# Patient Record
Sex: FEMALE | Race: WHITE | NOT HISPANIC OR LATINO
[De-identification: names, ages, dates, MRNs, and addresses within clinical notes are randomized per-mention and may not be internally consistent; named-entity substitution may affect disease eponyms.]

---

## 2019-12-05 ENCOUNTER — APPOINTMENT (OUTPATIENT)
Dept: OBGYN | Facility: CLINIC | Age: 39
End: 2019-12-05

## 2019-12-11 ENCOUNTER — APPOINTMENT (OUTPATIENT)
Dept: OBGYN | Facility: CLINIC | Age: 39
End: 2019-12-11
Payer: COMMERCIAL

## 2019-12-11 VITALS
DIASTOLIC BLOOD PRESSURE: 62 MMHG | OXYGEN SATURATION: 98 % | HEART RATE: 83 BPM | BODY MASS INDEX: 26.37 KG/M2 | WEIGHT: 168 LBS | HEIGHT: 67 IN | SYSTOLIC BLOOD PRESSURE: 102 MMHG

## 2019-12-11 DIAGNOSIS — Z11.3 ENCOUNTER FOR SCREENING FOR INFECTIONS WITH A PREDOMINANTLY SEXUAL MODE OF TRANSMISSION: ICD-10-CM

## 2019-12-11 DIAGNOSIS — Z78.9 OTHER SPECIFIED HEALTH STATUS: ICD-10-CM

## 2019-12-11 DIAGNOSIS — F17.200 NICOTINE DEPENDENCE, UNSPECIFIED, UNCOMPLICATED: ICD-10-CM

## 2019-12-11 PROCEDURE — 99203 OFFICE O/P NEW LOW 30 MIN: CPT

## 2019-12-11 NOTE — REVIEW OF SYSTEMS
[Abdominal Pain] : abdominal pain [Pelvic Pain] : pelvic pain [Abn Vag Bleeding] : abnormal vaginal bleeding [Headache] : headache [Depression] : depression [Anxiety] : anxiety [Feeling Weak] : feeling week [Chills] : no chills [Fever] : no fever [Feeling Tired] : not feeling tired [Redness] : no redness [Recent Wt Gain ___ Lbs] : no recent weight gain [Pain] : no pain [Sight Problems] : no sight problems [Dec Hearing] : no hearing loss [Discharge] : no discharge [Sore Throat] : no sore throat [Nasal Discharge] : no nasal discharge [Nosebleeds] : no nosebleeds [Heart Rate Is Fast] : the heart rate was not fast [Palpitations] : no palpitations [Chest Pain] : no chest pain [Lower Ext Edema] : no lower extremity edema [Wheezing] : no wheezing [Dyspnea] : no shortness of breath [Vomiting] : no vomiting [Cough] : no cough [SOB on Exertion] : no shortness of breath during exertion [Constipation] : no constipation [Heartburn] : no heartburn [Diarrhea] : no diarrhea [Melena] : no melena [Dysuria] : no dysuria [Incontinence] : no incontinence [Urethral Discharge] : no abnormal urethral discharge [Frequency] : no frequency [Urgency] : no urgency [Joint Pain] : no joint pain [Arthralgias] : no arthralgias [Skin Lesions] : no skin lesions [Joint Stiffness] : no joint stiffness [Joint Swelling] : no joint swelling [Change In A Mole] : no change in a mole [Breast Pain] : no breast pain [Breast Lump] : no breast lump [Dizziness] : no dizziness [Fainting] : no fainting [Convulsions] : no convulsions [Hot Flashes] : no hot flashes [Sleep Disturbances] : no sleep disturbances [Muscle Weakness] : no muscle weakness [Easy Bleeding] : does not bleed easily [Deepening Voice] : no deepening of the voice [Swollen Glands In The Neck] : no swollen glands in the neck [Easy Bruising] : does not bruise easily [Swollen Glands] : no swollen glands

## 2019-12-11 NOTE — OB HISTORY
[Failure to Progress in Labor] : failure to progress in labor [Repeat ] : repeat  [Pregnancy History] : girl [___] : no pregnancy complications reported

## 2019-12-11 NOTE — CHIEF COMPLAINT
[Initial Visit] : initial GYN visit [FreeTextEntry1] : Breakthru bleeding ( spotting ) past 5 - 6 months Some blood in urine  Denies dysuriia Denies postcoital bleeding  Normally seen at [facility] + yellow discharge with odor

## 2019-12-11 NOTE — PHYSICAL EXAM
[Awake] : awake [Acute Distress] : no acute distress [LAD] : no lymphadenopathy [Alert] : alert [Goiter] : no goiter [Thyroid Nodule] : no thyroid nodule [Mass] : no breast mass [Axillary LAD] : no axillary lymphadenopathy [Nipple Discharge] : no nipple discharge [Tender] : non tender [Distended] : not distended [H/Smegaly] : no hepatosplenomegaly [Oriented x3] : oriented to person, place, and time [Flat Affect] : affect not flat [Vulvar Atrophy] : no vulvar atrophy [Depressed Mood] : not depressed [Labia Minora Erythema] : no erythema of the labia minora [Labia Majora Erythema] : no erythema of the labia majora [Vulvitis] : no vulvitis [Labia Majora] : labia major [Labia Minora] : labia minora [Normal] : clitoris [Atrophy] : no atrophy [Erythema] : no erythema [Cystocele] : no cystocele [Rectocele] : no rectocele [Dry Mucosa] : moist mucosa [No Bleeding] : there was no active vaginal bleeding [Discharge] : had no discharge [Uterine Prolapse] : no uterine prolapse [Erosion] : had no erosions [IUD String] : did not have an IUD string protruding out [Motion Tenderness] : there was no cervical motion tenderness [Soft] :  the cervix was soft [Pap Obtained] : a Pap smear was performed [Normal Position] : in a normal position [Tenderness] : nontender [Mass ___ cm] : no uterine mass was palpated [Uterine Adnexae] : were not tender and not enlarged [Enlarged ___ wks] : not enlarged [Adnexa Tenderness] : were not tender [Ovarian Mass (___ Cm)] : there were no adnexal masses

## 2019-12-11 NOTE — HISTORY OF PRESENT ILLNESS
[Definite:  ___ (Date)] : the last menstrual period was [unfilled] [Normal Amount/Duration] : was of a normal amount and duration [Regular Cycle Intervals] : periods have been regular [Menarche Age: ____] : age at menarche was [unfilled] [Menstrual Cramps] : menstrual cramps [Monogamous] : is monogamous [Sexually Active] : is sexually active [Contraception] : uses contraception [IUD] : has an intrauterine device [Male ___] : [unfilled] male [de-identified] : Paragard X 5 years

## 2019-12-14 LAB
C TRACH RRNA SPEC QL NAA+PROBE: NOT DETECTED
CANDIDA VAG CYTO: NOT DETECTED
G VAGINALIS+PREV SP MTYP VAG QL MICRO: DETECTED
HPV HIGH+LOW RISK DNA PNL CVX: NOT DETECTED
N GONORRHOEA RRNA SPEC QL NAA+PROBE: NOT DETECTED
SOURCE AMPLIFICATION: NORMAL
T VAGINALIS VAG QL WET PREP: NOT DETECTED

## 2019-12-18 LAB — CYTOLOGY CVX/VAG DOC THIN PREP: ABNORMAL

## 2020-01-02 ENCOUNTER — FORM ENCOUNTER (OUTPATIENT)
Age: 40
End: 2020-01-02

## 2020-01-03 ENCOUNTER — APPOINTMENT (OUTPATIENT)
Dept: MAMMOGRAPHY | Facility: HOSPITAL | Age: 40
End: 2020-01-03
Payer: COMMERCIAL

## 2020-01-03 ENCOUNTER — APPOINTMENT (OUTPATIENT)
Dept: ULTRASOUND IMAGING | Facility: HOSPITAL | Age: 40
End: 2020-01-03
Payer: COMMERCIAL

## 2020-01-03 ENCOUNTER — OUTPATIENT (OUTPATIENT)
Dept: OUTPATIENT SERVICES | Facility: HOSPITAL | Age: 40
LOS: 1 days | End: 2020-01-03
Payer: COMMERCIAL

## 2020-01-03 DIAGNOSIS — N92.1 EXCESSIVE AND FREQUENT MENSTRUATION WITH IRREGULAR CYCLE: ICD-10-CM

## 2020-01-03 DIAGNOSIS — Z01.419 ENCOUNTER FOR GYNECOLOGICAL EXAMINATION (GENERAL) (ROUTINE) WITHOUT ABNORMAL FINDINGS: ICD-10-CM

## 2020-01-03 PROCEDURE — 76856 US EXAM PELVIC COMPLETE: CPT | Mod: 26

## 2020-01-03 PROCEDURE — 76830 TRANSVAGINAL US NON-OB: CPT

## 2020-01-03 PROCEDURE — 77063 BREAST TOMOSYNTHESIS BI: CPT | Mod: 26

## 2020-01-03 PROCEDURE — 77067 SCR MAMMO BI INCL CAD: CPT | Mod: 26

## 2020-01-03 PROCEDURE — 77067 SCR MAMMO BI INCL CAD: CPT

## 2020-01-03 PROCEDURE — 77063 BREAST TOMOSYNTHESIS BI: CPT

## 2020-01-03 PROCEDURE — 76856 US EXAM PELVIC COMPLETE: CPT

## 2020-01-03 PROCEDURE — 76830 TRANSVAGINAL US NON-OB: CPT | Mod: 26

## 2020-09-16 ENCOUNTER — NON-APPOINTMENT (OUTPATIENT)
Age: 40
End: 2020-09-16

## 2020-09-16 ENCOUNTER — LABORATORY RESULT (OUTPATIENT)
Age: 40
End: 2020-09-16

## 2020-09-16 ENCOUNTER — APPOINTMENT (OUTPATIENT)
Dept: FAMILY MEDICINE | Facility: CLINIC | Age: 40
End: 2020-09-16
Payer: COMMERCIAL

## 2020-09-16 VITALS
SYSTOLIC BLOOD PRESSURE: 109 MMHG | DIASTOLIC BLOOD PRESSURE: 72 MMHG | HEIGHT: 67 IN | BODY MASS INDEX: 25.16 KG/M2 | WEIGHT: 160.31 LBS | RESPIRATION RATE: 14 BRPM | OXYGEN SATURATION: 99 % | TEMPERATURE: 98.2 F | HEART RATE: 77 BPM

## 2020-09-16 DIAGNOSIS — Z23 ENCOUNTER FOR IMMUNIZATION: ICD-10-CM

## 2020-09-16 DIAGNOSIS — N92.1 EXCESSIVE AND FREQUENT MENSTRUATION WITH IRREGULAR CYCLE: ICD-10-CM

## 2020-09-16 DIAGNOSIS — Z87.42 PERSONAL HISTORY OF OTHER DISEASES OF THE FEMALE GENITAL TRACT: ICD-10-CM

## 2020-09-16 DIAGNOSIS — H54.3 UNQUALIFIED VISUAL LOSS, BOTH EYES: ICD-10-CM

## 2020-09-16 DIAGNOSIS — N76.0 ACUTE VAGINITIS: ICD-10-CM

## 2020-09-16 DIAGNOSIS — Z11.59 ENCOUNTER FOR SCREENING FOR OTHER VIRAL DISEASES: ICD-10-CM

## 2020-09-16 DIAGNOSIS — B96.89 ACUTE VAGINITIS: ICD-10-CM

## 2020-09-16 DIAGNOSIS — Z97.5 EXCESSIVE AND FREQUENT MENSTRUATION WITH IRREGULAR CYCLE: ICD-10-CM

## 2020-09-16 PROCEDURE — 90686 IIV4 VACC NO PRSV 0.5 ML IM: CPT

## 2020-09-16 PROCEDURE — G0008: CPT | Mod: 59

## 2020-09-16 PROCEDURE — 99213 OFFICE O/P EST LOW 20 MIN: CPT | Mod: 25

## 2020-09-16 PROCEDURE — 99385 PREV VISIT NEW AGE 18-39: CPT | Mod: 25

## 2020-09-16 PROCEDURE — 93000 ELECTROCARDIOGRAM COMPLETE: CPT

## 2020-09-16 PROCEDURE — 90472 IMMUNIZATION ADMIN EACH ADD: CPT

## 2020-09-16 PROCEDURE — 36415 COLL VENOUS BLD VENIPUNCTURE: CPT

## 2020-09-16 PROCEDURE — 90715 TDAP VACCINE 7 YRS/> IM: CPT

## 2020-09-16 RX ORDER — METRONIDAZOLE 7.5 MG/G
0.75 GEL VAGINAL
Qty: 1 | Refills: 1 | Status: COMPLETED | COMMUNITY
Start: 2019-12-11 | End: 2020-09-16

## 2020-09-16 RX ORDER — METRONIDAZOLE 500 MG/1
500 TABLET ORAL TWICE DAILY
Qty: 14 | Refills: 1 | Status: COMPLETED | COMMUNITY
Start: 2019-12-14 | End: 2020-09-16

## 2020-09-16 NOTE — REVIEW OF SYSTEMS
[Depression] : depression [Negative] : Integumentary [Suicidal] : not suicidal [Insomnia] : no insomnia [Anxiety] : no anxiety

## 2020-09-16 NOTE — PHYSICAL EXAM
[No Edema] : there was no peripheral edema [Declined Breast Exam] : declined breast exam  [Alert and Oriented x3] : oriented to person, place, and time [Normal] : affect was normal and insight and judgment were intact [de-identified] : done by GYN

## 2020-09-16 NOTE — COUNSELING
[Hazards of at-risk alcohol use discussed] : Hazards of at-risk alcohol use discussed [Good understanding] : Patient has a good understanding of lifestyle changes and steps needed to achieve self management goal

## 2020-09-16 NOTE — HEALTH RISK ASSESSMENT
[Very Good] : ~his/her~  mood as very good [Yes] : Yes [2 - 3 times a week (3 pts)] : 2 - 3  times a week (3 points) [1 or 2 (0 pts)] : 1 or 2 (0 points) [Never (0 pts)] : Never (0 points) [No falls in past year] : Patient reported no falls in the past year [No] : In the past 12 months have you used drugs other than those required for medical reasons? No [1] : 1) Little interest or pleasure doing things for several days (1) [Patient reported mammogram was normal] : Patient reported mammogram was normal [HIV Test offered] : HIV Test offered [Hepatitis C test offered] : Hepatitis C test offered [None] : None [With Family] : lives with family [Employed] : employed [] :  [College] : College [Fully functional (bathing, dressing, toileting, transferring, walking, feeding)] : Fully functional (bathing, dressing, toileting, transferring, walking, feeding) [# Of Children ___] : has [unfilled] children [Sexually Active] : sexually active [Fully functional (using the telephone, shopping, preparing meals, housekeeping, doing laundry, using] : Fully functional and needs no help or supervision to perform IADLs (using the telephone, shopping, preparing meals, housekeeping, doing laundry, using transportation, managing medications and managing finances) [Smoke Detector] : smoke detector [Seat Belt] :  uses seat belt [Carbon Monoxide Detector] : carbon monoxide detector [Sunscreen] : uses sunscreen [Aggressive treatment] : aggressive treatment [With Patient/Caregiver] : With Patient/Caregiver [FreeTextEntry1] : none [] : No [de-identified] : no [de-identified] : none [de-identified] : socially  [Audit-CScore] : 3 [de-identified] : walking , vita  [de-identified] : healthy  [[id number]] : 4 [YAW6Flkod] : 5 [Change in mental status noted] : No change in mental status noted [Language] : denies difficulty with language [Reports changes in hearing] : Reports no changes in hearing [Reports changes in vision] : Reports no changes in vision [Reports changes in dental health] : Reports no changes in dental health [MammogramDate] : 01/20 [PapSmearDate] : 12/19 [BoneDensityDate] : NA [ColonoscopyDate] : NA [AdvancecareDate] : 09/20

## 2020-09-16 NOTE — HISTORY OF PRESENT ILLNESS
[FreeTextEntry1] : cc: new pt, physical , Depression  [de-identified] : Patient came  for physical. Denies CP,SOB,Abd pain, no N,V,C,D.Pt c/o depression, HX of Depression  in the past, used to be on meds in her 20's,NS,NH,never hospitalized,Pt c/o b.l eyes decreased  vision, gradual \par

## 2020-09-17 ENCOUNTER — LABORATORY RESULT (OUTPATIENT)
Age: 40
End: 2020-09-17

## 2020-09-21 LAB
25(OH)D3 SERPL-MCNC: 24.8 NG/ML
ALBUMIN SERPL ELPH-MCNC: 4.5 G/DL
ALP BLD-CCNC: 67 U/L
ALT SERPL-CCNC: 10 U/L
ANION GAP SERPL CALC-SCNC: 13 MMOL/L
APPEARANCE: CLEAR
AST SERPL-CCNC: 23 U/L
BASOPHILS # BLD AUTO: 0.03 K/UL
BASOPHILS NFR BLD AUTO: 0.5 %
BILIRUB SERPL-MCNC: 0.2 MG/DL
BILIRUBIN URINE: NEGATIVE
BLOOD URINE: NEGATIVE
BUN SERPL-MCNC: 17 MG/DL
CALCIUM SERPL-MCNC: 9.4 MG/DL
CHLORIDE SERPL-SCNC: 102 MMOL/L
CHOLEST SERPL-MCNC: 196 MG/DL
CHOLEST/HDLC SERPL: 3.3 RATIO
CO2 SERPL-SCNC: 23 MMOL/L
COLOR: NORMAL
CREAT SERPL-MCNC: 0.68 MG/DL
EOSINOPHIL # BLD AUTO: 0.06 K/UL
EOSINOPHIL NFR BLD AUTO: 1 %
ESTIMATED AVERAGE GLUCOSE: 100 MG/DL
FOLATE SERPL-MCNC: 10.1 NG/ML
GLUCOSE QUALITATIVE U: NEGATIVE
GLUCOSE SERPL-MCNC: 95 MG/DL
HBA1C MFR BLD HPLC: 5.1 %
HCT VFR BLD CALC: 31.8 %
HDLC SERPL-MCNC: 59 MG/DL
HGB BLD-MCNC: 9.7 G/DL
HIV1+2 AB SPEC QL IA.RAPID: NONREACTIVE
IMM GRANULOCYTES NFR BLD AUTO: 0.2 %
KETONES URINE: NEGATIVE
LDLC SERPL CALC-MCNC: 116 MG/DL
LEUKOCYTE ESTERASE URINE: ABNORMAL
LYMPHOCYTES # BLD AUTO: 1.56 K/UL
LYMPHOCYTES NFR BLD AUTO: 25.3 %
MAN DIFF?: NORMAL
MCHC RBC-ENTMCNC: 23.1 PG
MCHC RBC-ENTMCNC: 30.5 GM/DL
MCV RBC AUTO: 75.7 FL
MONOCYTES # BLD AUTO: 0.54 K/UL
MONOCYTES NFR BLD AUTO: 8.8 %
NEUTROPHILS # BLD AUTO: 3.97 K/UL
NEUTROPHILS NFR BLD AUTO: 64.2 %
NITRITE URINE: NEGATIVE
PH URINE: 6
PLATELET # BLD AUTO: 294 K/UL
POTASSIUM SERPL-SCNC: 4 MMOL/L
PROT SERPL-MCNC: 6.7 G/DL
PROTEIN URINE: NEGATIVE
RBC # BLD: 4.2 M/UL
RBC # FLD: 17.8 %
SARS-COV-2 IGG SERPL IA-ACNC: 0.1 INDEX
SARS-COV-2 IGG SERPL QL IA: NEGATIVE
SODIUM SERPL-SCNC: 138 MMOL/L
SPECIFIC GRAVITY URINE: 1.01
TRIGL SERPL-MCNC: 106 MG/DL
TSH SERPL-ACNC: 0.98 UIU/ML
URATE SERPL-MCNC: 3.9 MG/DL
UROBILINOGEN URINE: NORMAL
VIT B12 SERPL-MCNC: 738 PG/ML
WBC # FLD AUTO: 6.17 K/UL

## 2020-09-29 ENCOUNTER — APPOINTMENT (OUTPATIENT)
Dept: FAMILY MEDICINE | Facility: CLINIC | Age: 40
End: 2020-09-29

## 2020-12-21 ENCOUNTER — NON-APPOINTMENT (OUTPATIENT)
Age: 40
End: 2020-12-21

## 2020-12-21 ENCOUNTER — APPOINTMENT (OUTPATIENT)
Dept: FAMILY MEDICINE | Facility: CLINIC | Age: 40
End: 2020-12-21
Payer: COMMERCIAL

## 2020-12-21 VITALS
WEIGHT: 166 LBS | HEIGHT: 67 IN | OXYGEN SATURATION: 99 % | SYSTOLIC BLOOD PRESSURE: 106 MMHG | DIASTOLIC BLOOD PRESSURE: 58 MMHG | HEART RATE: 79 BPM | TEMPERATURE: 97.7 F | BODY MASS INDEX: 26.06 KG/M2 | RESPIRATION RATE: 16 BRPM

## 2020-12-21 DIAGNOSIS — Z23 ENCOUNTER FOR IMMUNIZATION: ICD-10-CM

## 2020-12-21 PROCEDURE — 90732 PPSV23 VACC 2 YRS+ SUBQ/IM: CPT | Mod: 59

## 2020-12-21 PROCEDURE — 99072 ADDL SUPL MATRL&STAF TM PHE: CPT

## 2020-12-21 PROCEDURE — 36415 COLL VENOUS BLD VENIPUNCTURE: CPT

## 2020-12-21 PROCEDURE — G0009: CPT

## 2020-12-21 PROCEDURE — 99215 OFFICE O/P EST HI 40 MIN: CPT | Mod: 25

## 2020-12-21 PROCEDURE — 93000 ELECTROCARDIOGRAM COMPLETE: CPT

## 2020-12-21 NOTE — PHYSICAL EXAM
[No Varicosities] : no varicosities [No Edema] : there was no peripheral edema [Normal] : no joint swelling and grossly normal strength and tone [Coordination Grossly Intact] : coordination grossly intact [No Focal Deficits] : no focal deficits [Normal Gait] : normal gait [Alert and Oriented x3] : oriented to person, place, and time [de-identified] : pale skin

## 2020-12-21 NOTE — HISTORY OF PRESENT ILLNESS
[FreeTextEntry8] : cc: heart palpitation, f/u Anemia, vit D \par Patient came for f/u on her Anemia, she never started Fe PO or f/u with Hematologist, Few days ago she had a glass of wine , then she felt worm, sweating, she c/o heart palpitation on and off, few months, no CP, no HA,no N.no V.no visual changes.

## 2020-12-21 NOTE — COUNSELING
[Cessation strategies including cessation program discussed] : Cessation strategies including cessation program discussed [Use of nicotine replacement therapies and other medications discussed] : Use of nicotine replacement therapies and other medications discussed [Encouraged to pick a quit date and identify support needed to quit] : Encouraged to pick a quit date and identify support needed to quit [Benefits of weight loss discussed] : Benefits of weight loss discussed [Encouraged to maintain food diary] : Encouraged to maintain food diary [Encouraged to increase physical activity] : Encouraged to increase physical activity [Encouraged to use exercise tracking device] : Encouraged to use exercise tracking device [Weigh Self Weekly] : weigh self weekly [Decrease Portions] : decrease portions [____ min/wk Activity] : [unfilled] min/wk activity [Keep Food Diary] : keep food diary [Good understanding] : Patient has a good understanding of lifestyle changes and steps needed to achieve self management goal

## 2020-12-21 NOTE — REVIEW OF SYSTEMS
[Chest Pain] : no chest pain [Palpitations] : palpitations [Leg Claudication] : no leg claudication [Lower Ext Edema] : no lower extremity edema [Orthopnea] : no orthopnea [Negative] : Psychiatric

## 2020-12-21 NOTE — HEALTH RISK ASSESSMENT
[] : Yes [Yes] : Yes [2 - 4 times a month (2 pts)] : 2-4 times a month (2 points) [1 or 2 (0 pts)] : 1 or 2 (0 points) [Never (0 pts)] : Never (0 points) [No] : In the past 12 months have you used drugs other than those required for medical reasons? No [No falls in past year] : Patient reported no falls in the past year [Audit-CScore] : 2 [de-identified] : walking  [de-identified] : healthy

## 2020-12-21 NOTE — DATA REVIEWED
[FreeTextEntry1] : last blood work d/w the pt at length\par \par \par EKG - NSR at 82 bpm, no acute ST - T changes

## 2020-12-22 LAB
25(OH)D3 SERPL-MCNC: 25.7 NG/ML
ALBUMIN SERPL ELPH-MCNC: 4 G/DL
ALP BLD-CCNC: 82 U/L
ALT SERPL-CCNC: 5 U/L
ANION GAP SERPL CALC-SCNC: 10 MMOL/L
AST SERPL-CCNC: 16 U/L
BASOPHILS # BLD AUTO: 0.02 K/UL
BASOPHILS NFR BLD AUTO: 0.4 %
BILIRUB SERPL-MCNC: <0.2 MG/DL
BUN SERPL-MCNC: 20 MG/DL
CALCIUM SERPL-MCNC: 9.1 MG/DL
CHLORIDE SERPL-SCNC: 108 MMOL/L
CO2 SERPL-SCNC: 25 MMOL/L
CREAT SERPL-MCNC: 0.73 MG/DL
EOSINOPHIL # BLD AUTO: 0.09 K/UL
EOSINOPHIL NFR BLD AUTO: 1.8 %
FERRITIN SERPL-MCNC: 5 NG/ML
FOLATE SERPL-MCNC: 13.1 NG/ML
GLUCOSE SERPL-MCNC: 80 MG/DL
HCT VFR BLD CALC: 32.8 %
HGB BLD-MCNC: 9.9 G/DL
IMM GRANULOCYTES NFR BLD AUTO: 0.2 %
IRON SATN MFR SERPL: 5 %
IRON SERPL-MCNC: 18 UG/DL
LYMPHOCYTES # BLD AUTO: 1.43 K/UL
LYMPHOCYTES NFR BLD AUTO: 27.9 %
MAN DIFF?: NORMAL
MCHC RBC-ENTMCNC: 21.7 PG
MCHC RBC-ENTMCNC: 30.2 GM/DL
MCV RBC AUTO: 71.8 FL
MONOCYTES # BLD AUTO: 0.57 K/UL
MONOCYTES NFR BLD AUTO: 11.1 %
NEUTROPHILS # BLD AUTO: 3 K/UL
NEUTROPHILS NFR BLD AUTO: 58.6 %
PLATELET # BLD AUTO: 268 K/UL
POTASSIUM SERPL-SCNC: 4.4 MMOL/L
PROT SERPL-MCNC: 6.6 G/DL
RBC # BLD: 4.57 M/UL
RBC # BLD: 4.57 M/UL
RBC # FLD: 16 %
RETICS # AUTO: 0.9 %
RETICS AGGREG/RBC NFR: 41.1 K/UL
SARS-COV-2 IGG SERPL IA-ACNC: 0.23 INDEX
SARS-COV-2 IGG SERPL QL IA: NEGATIVE
SODIUM SERPL-SCNC: 142 MMOL/L
T3FREE SERPL-MCNC: 2.84 PG/ML
T4 FREE SERPL-MCNC: 1.2 NG/DL
TIBC SERPL-MCNC: 369 UG/DL
TRANSFERRIN SERPL-MCNC: 277 MG/DL
TSH SERPL-ACNC: 1.66 UIU/ML
UIBC SERPL-MCNC: 351 UG/DL
VIT B12 SERPL-MCNC: 684 PG/ML
WBC # FLD AUTO: 5.12 K/UL

## 2020-12-28 LAB — THYROPEROXIDASE AB SERPL IA-ACNC: 149 IU/ML

## 2020-12-30 LAB
HGB A MFR BLD: 97.6 %
HGB A2 MFR BLD: 2.4 %
HGB FRACT BLD-IMP: NORMAL

## 2021-01-05 LAB — HEMOCCULT STL QL IA: NEGATIVE

## 2021-01-22 ENCOUNTER — APPOINTMENT (OUTPATIENT)
Dept: CARDIOLOGY | Facility: CLINIC | Age: 41
End: 2021-01-22

## 2021-01-25 ENCOUNTER — APPOINTMENT (OUTPATIENT)
Dept: FAMILY MEDICINE | Facility: CLINIC | Age: 41
End: 2021-01-25
Payer: COMMERCIAL

## 2021-01-25 VITALS
TEMPERATURE: 97.8 F | OXYGEN SATURATION: 99 % | RESPIRATION RATE: 14 BRPM | WEIGHT: 167 LBS | BODY MASS INDEX: 26.21 KG/M2 | SYSTOLIC BLOOD PRESSURE: 108 MMHG | HEIGHT: 67 IN | HEART RATE: 63 BPM | DIASTOLIC BLOOD PRESSURE: 58 MMHG

## 2021-01-25 PROCEDURE — 99214 OFFICE O/P EST MOD 30 MIN: CPT

## 2021-01-25 PROCEDURE — 99072 ADDL SUPL MATRL&STAF TM PHE: CPT

## 2021-01-25 NOTE — HISTORY OF PRESENT ILLNESS
23 [FreeTextEntry8] : cc:  f/u Depression  ,  Anemia, vit D \par Patient presented  for f/u on her Anemia, she takes her  Fe PO BID, seen by Hematol - repeat Hb was 11.5, doing much better, no heart palpitation, Deneis CP,SOB, abd pain, her Depression  improved, NS,NH ,  no CP, no HA,no N.no V.no visual changes.

## 2021-01-25 NOTE — HEALTH RISK ASSESSMENT
[] : Yes [Yes] : Yes [2 - 4 times a month (2 pts)] : 2-4 times a month (2 points) [1 or 2 (0 pts)] : 1 or 2 (0 points) [Never (0 pts)] : Never (0 points) [No] : In the past 12 months have you used drugs other than those required for medical reasons? No [No falls in past year] : Patient reported no falls in the past year [0] : 2) Feeling down, depressed, or hopeless: Not at all (0) [de-identified] : Hematol  [Audit-CScore] : 2 [de-identified] : walking  [de-identified] : healthy  [NXR0Mpbww] : 0

## 2021-01-25 NOTE — REVIEW OF SYSTEMS
[Chest Pain] : no chest pain [Palpitations] : no palpitations [Leg Claudication] : no leg claudication [Lower Ext Edema] : no lower extremity edema [Orthopnea] : no orthopnea [Negative] : Psychiatric

## 2021-01-25 NOTE — PHYSICAL EXAM
[No Varicosities] : no varicosities [No Edema] : there was no peripheral edema [Normal] : no joint swelling and grossly normal strength and tone [Coordination Grossly Intact] : coordination grossly intact [No Focal Deficits] : no focal deficits [Normal Gait] : normal gait [Alert and Oriented x3] : oriented to person, place, and time [de-identified] : pale skin

## 2021-04-04 ENCOUNTER — TRANSCRIPTION ENCOUNTER (OUTPATIENT)
Age: 41
End: 2021-04-04

## 2021-06-30 ENCOUNTER — APPOINTMENT (OUTPATIENT)
Dept: OBGYN | Facility: CLINIC | Age: 41
End: 2021-06-30
Payer: COMMERCIAL

## 2021-06-30 VITALS
OXYGEN SATURATION: 98 % | WEIGHT: 165 LBS | BODY MASS INDEX: 25.9 KG/M2 | HEIGHT: 67 IN | HEART RATE: 64 BPM | RESPIRATION RATE: 14 BRPM | SYSTOLIC BLOOD PRESSURE: 110 MMHG | TEMPERATURE: 95.4 F | DIASTOLIC BLOOD PRESSURE: 72 MMHG

## 2021-06-30 DIAGNOSIS — Z82.3 FAMILY HISTORY OF STROKE: ICD-10-CM

## 2021-06-30 DIAGNOSIS — N92.0 EXCESSIVE AND FREQUENT MENSTRUATION WITH REGULAR CYCLE: ICD-10-CM

## 2021-06-30 PROCEDURE — 99395 PREV VISIT EST AGE 18-39: CPT

## 2021-06-30 PROCEDURE — 99213 OFFICE O/P EST LOW 20 MIN: CPT | Mod: 25

## 2021-06-30 NOTE — HISTORY OF PRESENT ILLNESS
[FreeTextEntry1] : Check up  Persistent anemia regular menses lasting 7 days Using menstrual cup On heavy days has to empty Q 3 hours\par \par C/O chills and fatigue with menses for past 4 months [Y] : Patient reports abnormal menses [Definite ___ (Date)] : the last menstrual period was [unfilled] [Excessive Bleeding] : bleeding has been excessive [Regular Cycle Intervals] : have been regular [Frequency: Q ___ days] : occur approximately every [unfilled] days [LMPDate] : 6/22/21 [MensesFreq] : 28 [MensesLength] : 7 [[facility]xFullTerm] : 2 [[facility]Living] : 2 [PGHxABInduced] : 3 [PGxEctopic] : 1

## 2021-07-05 LAB
CYTOLOGY CVX/VAG DOC THIN PREP: NORMAL
HPV HIGH+LOW RISK DNA PNL CVX: NOT DETECTED

## 2021-07-06 ENCOUNTER — APPOINTMENT (OUTPATIENT)
Dept: ULTRASOUND IMAGING | Facility: HOSPITAL | Age: 41
End: 2021-07-06
Payer: COMMERCIAL

## 2021-07-06 ENCOUNTER — OUTPATIENT (OUTPATIENT)
Dept: OUTPATIENT SERVICES | Facility: HOSPITAL | Age: 41
LOS: 1 days | End: 2021-07-06
Payer: COMMERCIAL

## 2021-07-06 DIAGNOSIS — N92.0 EXCESSIVE AND FREQUENT MENSTRUATION WITH REGULAR CYCLE: ICD-10-CM

## 2021-07-06 PROCEDURE — 76830 TRANSVAGINAL US NON-OB: CPT | Mod: 26

## 2021-07-06 PROCEDURE — 76856 US EXAM PELVIC COMPLETE: CPT

## 2021-07-06 PROCEDURE — 76830 TRANSVAGINAL US NON-OB: CPT

## 2021-07-06 PROCEDURE — 76856 US EXAM PELVIC COMPLETE: CPT | Mod: 26

## 2021-08-27 LAB
BASOPHILS # BLD AUTO: 0.02 K/UL
BASOPHILS NFR BLD AUTO: 0.4 %
EOSINOPHIL # BLD AUTO: 0.17 K/UL
EOSINOPHIL NFR BLD AUTO: 3 %
FERRITIN SERPL-MCNC: 24 NG/ML
HCT VFR BLD CALC: 40.6 %
HGB BLD-MCNC: 13.2 G/DL
IMM GRANULOCYTES NFR BLD AUTO: 0.5 %
IRON SERPL-MCNC: 54 UG/DL
LYMPHOCYTES # BLD AUTO: 1.4 K/UL
LYMPHOCYTES NFR BLD AUTO: 24.7 %
MAN DIFF?: NORMAL
MCHC RBC-ENTMCNC: 27.4 PG
MCHC RBC-ENTMCNC: 32.5 GM/DL
MCV RBC AUTO: 84.2 FL
MONOCYTES # BLD AUTO: 0.45 K/UL
MONOCYTES NFR BLD AUTO: 7.9 %
NEUTROPHILS # BLD AUTO: 3.6 K/UL
NEUTROPHILS NFR BLD AUTO: 63.5 %
PLATELET # BLD AUTO: 284 K/UL
RBC # BLD: 4.82 M/UL
RBC # FLD: 12.6 %
WBC # FLD AUTO: 5.67 K/UL

## 2022-01-18 ENCOUNTER — RESULT REVIEW (OUTPATIENT)
Age: 42
End: 2022-01-18

## 2022-01-18 ENCOUNTER — OUTPATIENT (OUTPATIENT)
Dept: OUTPATIENT SERVICES | Facility: HOSPITAL | Age: 42
LOS: 1 days | End: 2022-01-18
Payer: COMMERCIAL

## 2022-01-18 ENCOUNTER — APPOINTMENT (OUTPATIENT)
Dept: MAMMOGRAPHY | Facility: HOSPITAL | Age: 42
End: 2022-01-18
Payer: COMMERCIAL

## 2022-01-18 DIAGNOSIS — Z00.8 ENCOUNTER FOR OTHER GENERAL EXAMINATION: ICD-10-CM

## 2022-01-18 PROCEDURE — 77063 BREAST TOMOSYNTHESIS BI: CPT | Mod: 26

## 2022-01-18 PROCEDURE — 77067 SCR MAMMO BI INCL CAD: CPT | Mod: 26

## 2022-01-18 PROCEDURE — 77067 SCR MAMMO BI INCL CAD: CPT

## 2022-01-18 PROCEDURE — 77063 BREAST TOMOSYNTHESIS BI: CPT

## 2022-03-23 ENCOUNTER — NON-APPOINTMENT (OUTPATIENT)
Age: 42
End: 2022-03-23

## 2022-03-23 ENCOUNTER — APPOINTMENT (OUTPATIENT)
Dept: FAMILY MEDICINE | Facility: CLINIC | Age: 42
End: 2022-03-23
Payer: COMMERCIAL

## 2022-03-23 VITALS
TEMPERATURE: 97 F | OXYGEN SATURATION: 98 % | HEART RATE: 97 BPM | WEIGHT: 171 LBS | SYSTOLIC BLOOD PRESSURE: 116 MMHG | BODY MASS INDEX: 26.84 KG/M2 | DIASTOLIC BLOOD PRESSURE: 80 MMHG | RESPIRATION RATE: 17 BRPM | HEIGHT: 67 IN

## 2022-03-23 DIAGNOSIS — R00.2 PALPITATIONS: ICD-10-CM

## 2022-03-23 DIAGNOSIS — L50.9 URTICARIA, UNSPECIFIED: ICD-10-CM

## 2022-03-23 DIAGNOSIS — R10.9 UNSPECIFIED ABDOMINAL PAIN: ICD-10-CM

## 2022-03-23 PROCEDURE — G0444 DEPRESSION SCREEN ANNUAL: CPT | Mod: 59

## 2022-03-23 PROCEDURE — 93000 ELECTROCARDIOGRAM COMPLETE: CPT | Mod: 59

## 2022-03-23 PROCEDURE — 99215 OFFICE O/P EST HI 40 MIN: CPT | Mod: 25

## 2022-03-23 NOTE — HISTORY OF PRESENT ILLNESS
[FreeTextEntry8] : cc: skin problem, feeling michael, nervous, heart palpitation \par Patient came c/o not feeling well, c/o  skin problem, feeling michael, nervous, heart palpitation . Pt is not feeling well for the past 3 months, she was in [place]  in December, possible that she  has had COVID there, never tested, she is Vacc x3, Pt c/o  generalized hives, f/u with Dermatol,s/p Bx eeling cold, + heart palpitation, She c/o abd pain ,on ad off, + loss stool, no blood, no mucose, no fever, no chills,no weight loss, few months.

## 2022-03-23 NOTE — PHYSICAL EXAM
[No Varicosities] : no varicosities [No Edema] : there was no peripheral edema [Soft] : abdomen soft [Non-distended] : non-distended [No Masses] : no abdominal mass palpated [No HSM] : no HSM [Normal Bowel Sounds] : normal bowel sounds [Normal] : normal gait, coordination grossly intact, no focal deficits and deep tendon reflexes were 2+ and symmetric [de-identified] : generalized tenderness  [de-identified] : + hives

## 2022-03-23 NOTE — HEALTH RISK ASSESSMENT
[Current] : Current [Yes] : Yes [Monthly or less (1 pt)] : Monthly or less (1 point) [1 or 2 (0 pts)] : 1 or 2 (0 points) [Never (0 pts)] : Never (0 points) [No] : In the past 12 months have you used drugs other than those required for medical reasons? No [No falls in past year] : Patient reported no falls in the past year [1] : 2) Feeling down, depressed, or hopeless for several days (1) [PHQ-2 Positive] : PHQ-2 Positive [PHQ-9 Positive] : PHQ-9 Positive [I have developed a follow-up plan documented below in the note.] : I have developed a follow-up plan documented below in the note. [Audit-CScore] : 1 [de-identified] : walking  [de-identified] : healthy  [de-identified] : PHQ 9  14   ANA 7 - 14 [SQA5Fowlx] : 2

## 2022-03-24 LAB
25(OH)D3 SERPL-MCNC: 24.3 NG/ML
ALBUMIN SERPL ELPH-MCNC: 4.8 G/DL
ALP BLD-CCNC: 77 U/L
ALT SERPL-CCNC: 9 U/L
AMYLASE/CREAT SERPL: 94 U/L
ANION GAP SERPL CALC-SCNC: 11 MMOL/L
APPEARANCE: ABNORMAL
AST SERPL-CCNC: 15 U/L
BASOPHILS # BLD AUTO: 0.04 K/UL
BASOPHILS NFR BLD AUTO: 0.7 %
BILIRUB SERPL-MCNC: 0.3 MG/DL
BILIRUBIN URINE: NEGATIVE
BLOOD URINE: ABNORMAL
BUN SERPL-MCNC: 18 MG/DL
CALCIUM SERPL-MCNC: 9.9 MG/DL
CHLORIDE SERPL-SCNC: 105 MMOL/L
CHOLEST SERPL-MCNC: 198 MG/DL
CO2 SERPL-SCNC: 23 MMOL/L
COLOR: YELLOW
COVID-19 NUCLEOCAPSID  GAM ANTIBODY INTERPRETATION: POSITIVE
CREAT SERPL-MCNC: 0.69 MG/DL
CRP SERPL-MCNC: <3 MG/L
DEPRECATED D DIMER PPP IA-ACNC: <150 NG/ML DDU
EGFR: 112 ML/MIN/1.73M2
EOSINOPHIL # BLD AUTO: 0.12 K/UL
EOSINOPHIL NFR BLD AUTO: 2 %
ERYTHROCYTE [SEDIMENTATION RATE] IN BLOOD BY WESTERGREN METHOD: 10 MM/HR
FOLATE SERPL-MCNC: 14 NG/ML
GLUCOSE QUALITATIVE U: NEGATIVE
GLUCOSE SERPL-MCNC: 90 MG/DL
HAV IGM SER QL: NONREACTIVE
HBV CORE IGM SER QL: NONREACTIVE
HBV SURFACE AG SER QL: NONREACTIVE
HCT VFR BLD CALC: 42.2 %
HCV AB SER QL: NONREACTIVE
HCV S/CO RATIO: 0.15 S/CO
HDLC SERPL-MCNC: 55 MG/DL
HGB BLD-MCNC: 13.6 G/DL
IMM GRANULOCYTES NFR BLD AUTO: 0.3 %
KETONES URINE: NEGATIVE
LDLC SERPL CALC-MCNC: 120 MG/DL
LEUKOCYTE ESTERASE URINE: NEGATIVE
LPL SERPL-CCNC: 41 U/L
LYMPHOCYTES # BLD AUTO: 1.91 K/UL
LYMPHOCYTES NFR BLD AUTO: 31.3 %
MAN DIFF?: NORMAL
MCHC RBC-ENTMCNC: 25.2 PG
MCHC RBC-ENTMCNC: 32.2 GM/DL
MCV RBC AUTO: 78.1 FL
MONOCYTES # BLD AUTO: 0.49 K/UL
MONOCYTES NFR BLD AUTO: 8 %
NEUTROPHILS # BLD AUTO: 3.53 K/UL
NEUTROPHILS NFR BLD AUTO: 57.7 %
NITRITE URINE: NEGATIVE
NONHDLC SERPL-MCNC: 143 MG/DL
PH URINE: 5.5
PLATELET # BLD AUTO: 290 K/UL
POTASSIUM SERPL-SCNC: 4.2 MMOL/L
PROT SERPL-MCNC: 7.4 G/DL
PROTEIN URINE: NORMAL
RBC # BLD: 5.4 M/UL
RBC # FLD: 14.4 %
SARS-COV-2 AB SERPL QL IA: 221 INDEX
SODIUM SERPL-SCNC: 139 MMOL/L
SPECIFIC GRAVITY URINE: 1.03
T3FREE SERPL-MCNC: 3.19 PG/ML
T4 FREE SERPL-MCNC: 1.5 NG/DL
THYROPEROXIDASE AB SERPL IA-ACNC: 187 IU/ML
TRIGL SERPL-MCNC: 118 MG/DL
TSH SERPL-ACNC: 1.56 UIU/ML
URATE SERPL-MCNC: 3.6 MG/DL
UROBILINOGEN URINE: NORMAL
VIT B12 SERPL-MCNC: 601 PG/ML
WBC # FLD AUTO: 6.11 K/UL

## 2022-03-26 ENCOUNTER — OUTPATIENT (OUTPATIENT)
Dept: OUTPATIENT SERVICES | Facility: HOSPITAL | Age: 42
LOS: 1 days | End: 2022-03-26
Payer: COMMERCIAL

## 2022-03-26 ENCOUNTER — RESULT REVIEW (OUTPATIENT)
Age: 42
End: 2022-03-26

## 2022-03-26 ENCOUNTER — APPOINTMENT (OUTPATIENT)
Dept: ULTRASOUND IMAGING | Facility: HOSPITAL | Age: 42
End: 2022-03-26
Payer: COMMERCIAL

## 2022-03-26 DIAGNOSIS — R10.9 UNSPECIFIED ABDOMINAL PAIN: ICD-10-CM

## 2022-03-26 DIAGNOSIS — E06.3 AUTOIMMUNE THYROIDITIS: ICD-10-CM

## 2022-03-26 PROCEDURE — 76700 US EXAM ABDOM COMPLETE: CPT | Mod: 26

## 2022-03-26 PROCEDURE — 76536 US EXAM OF HEAD AND NECK: CPT | Mod: 26

## 2022-03-26 PROCEDURE — 76536 US EXAM OF HEAD AND NECK: CPT

## 2022-03-26 PROCEDURE — 76700 US EXAM ABDOM COMPLETE: CPT

## 2022-03-31 ENCOUNTER — APPOINTMENT (OUTPATIENT)
Dept: FAMILY MEDICINE | Facility: CLINIC | Age: 42
End: 2022-03-31
Payer: COMMERCIAL

## 2022-03-31 VITALS
DIASTOLIC BLOOD PRESSURE: 72 MMHG | SYSTOLIC BLOOD PRESSURE: 110 MMHG | RESPIRATION RATE: 16 BRPM | WEIGHT: 171 LBS | HEIGHT: 67 IN | HEART RATE: 95 BPM | TEMPERATURE: 97.2 F | OXYGEN SATURATION: 99 % | BODY MASS INDEX: 26.84 KG/M2

## 2022-03-31 DIAGNOSIS — N91.1 SECONDARY AMENORRHEA: ICD-10-CM

## 2022-03-31 DIAGNOSIS — L30.9 DERMATITIS, UNSPECIFIED: ICD-10-CM

## 2022-03-31 DIAGNOSIS — Z86.16 PERSONAL HISTORY OF COVID-19: ICD-10-CM

## 2022-03-31 DIAGNOSIS — R31.29 OTHER MICROSCOPIC HEMATURIA: ICD-10-CM

## 2022-03-31 PROCEDURE — 99214 OFFICE O/P EST MOD 30 MIN: CPT | Mod: 25

## 2022-03-31 NOTE — HISTORY OF PRESENT ILLNESS
[FreeTextEntry1] : cc:f/u  heart palpitation, fatigue, rash [de-identified] : PAtient came for f/u - based on blood work pt has had COVID  in 01/2022, her Ab came back +, when she was tested  rapid test then was negative, Patient still c/o heart palpitation on and off, did  make appt with the Caridol yet Pt c/o no period 3 months,in [place] she got IM inj to prevent period. TOday she denies Cp,no SOB.

## 2022-03-31 NOTE — PHYSICAL EXAM
[No Varicosities] : no varicosities [No Edema] : there was no peripheral edema [Soft] : abdomen soft [Non Tender] : non-tender [Non-distended] : non-distended [No Masses] : no abdominal mass palpated [No HSM] : no HSM [Normal Bowel Sounds] : normal bowel sounds [Normal] : normal gait, coordination grossly intact, no focal deficits and deep tendon reflexes were 2+ and symmetric [de-identified] : + hives

## 2022-03-31 NOTE — REVIEW OF SYSTEMS
[Fever] : no fever [Chills] : no chills [Fatigue] : fatigue [Chest Pain] : no chest pain [Palpitations] : palpitations [Leg Claudication] : no leg claudication [Lower Ext Edema] : no lower extremity edema [Orthopnea] : no orthopnea [Dysuria] : no dysuria [Hematuria] : no hematuria [Dysmenorrhea] : dysmenorrhea [Skin Rash] : skin rash [Negative] : Psychiatric [FreeTextEntry8] : + spotting

## 2022-04-03 LAB
APPEARANCE: CLEAR
BACTERIA UR CULT: NORMAL
BACTERIA: NEGATIVE
BASOPHILS # BLD AUTO: 0.03 K/UL
BASOPHILS NFR BLD AUTO: 0.6 %
BILIRUBIN URINE: NEGATIVE
BLOOD URINE: ABNORMAL
COLOR: YELLOW
EOSINOPHIL # BLD AUTO: 0.14 K/UL
EOSINOPHIL NFR BLD AUTO: 3 %
GLUCOSE QUALITATIVE U: NEGATIVE
HCG SERPL-MCNC: <1 MIU/ML
HCT VFR BLD CALC: 44.3 %
HGB BLD-MCNC: 13.9 G/DL
HYALINE CASTS: 3 /LPF
IMM GRANULOCYTES NFR BLD AUTO: 0.2 %
KETONES URINE: NEGATIVE
LEUKOCYTE ESTERASE URINE: NEGATIVE
LYMPHOCYTES # BLD AUTO: 1.53 K/UL
LYMPHOCYTES NFR BLD AUTO: 32.3 %
MAN DIFF?: NORMAL
MCHC RBC-ENTMCNC: 25.3 PG
MCHC RBC-ENTMCNC: 31.4 GM/DL
MCV RBC AUTO: 80.7 FL
MICROSCOPIC-UA: NORMAL
MONOCYTES # BLD AUTO: 0.45 K/UL
MONOCYTES NFR BLD AUTO: 9.5 %
NEUTROPHILS # BLD AUTO: 2.57 K/UL
NEUTROPHILS NFR BLD AUTO: 54.4 %
NITRITE URINE: NEGATIVE
PH URINE: 5.5
PLATELET # BLD AUTO: 291 K/UL
PROTEIN URINE: NEGATIVE
RBC # BLD: 5.49 M/UL
RBC # FLD: 14.6 %
RED BLOOD CELLS URINE: 3 /HPF
SPECIFIC GRAVITY URINE: >=1.03
SQUAMOUS EPITHELIAL CELLS: 9 /HPF
UROBILINOGEN URINE: NORMAL
WBC # FLD AUTO: 4.73 K/UL
WHITE BLOOD CELLS URINE: 9 /HPF

## 2022-04-14 ENCOUNTER — APPOINTMENT (OUTPATIENT)
Dept: SURGERY | Facility: CLINIC | Age: 42
End: 2022-04-14
Payer: COMMERCIAL

## 2022-04-14 ENCOUNTER — LABORATORY RESULT (OUTPATIENT)
Age: 42
End: 2022-04-14

## 2022-04-14 VITALS
HEIGHT: 67 IN | WEIGHT: 172 LBS | SYSTOLIC BLOOD PRESSURE: 130 MMHG | DIASTOLIC BLOOD PRESSURE: 85 MMHG | BODY MASS INDEX: 27 KG/M2

## 2022-04-14 DIAGNOSIS — Z83.49 FAMILY HISTORY OF OTHER ENDOCRINE, NUTRITIONAL AND METABOLIC DISEASES: ICD-10-CM

## 2022-04-14 PROCEDURE — 99203 OFFICE O/P NEW LOW 30 MIN: CPT | Mod: 25

## 2022-04-14 PROCEDURE — 10005 FNA BX W/US GDN 1ST LES: CPT

## 2022-04-14 NOTE — CONSULT LETTER
[Dear  ___] : Dear  [unfilled], [Consult Letter:] : I had the pleasure of evaluating your patient, [unfilled]. [Please see my note below.] : Please see my note below. [Consult Closing:] : Thank you very much for allowing me to participate in the care of this patient.  If you have any questions, please do not hesitate to contact me. [Sincerely,] : Sincerely, [FreeTextEntry2] : Dr. Stella Coronado [FreeTextEntry3] : Alyssa Pimentel MD, FACS\par Assistant Professor of Surgery and Otolaryngology\par [facility]\par

## 2022-04-14 NOTE — REASON FOR VISIT
[Procedure: _________] : a [unfilled] procedure visit [Initial Consultation] : an initial consultation for [FreeTextEntry2] : Hashimoto's thyroiditis with left thyroid nodule [Other: _____] : [unfilled]

## 2022-04-14 NOTE — HISTORY OF PRESENT ILLNESS
[de-identified] : Patient referred by Dr. Coronado for evaluation of left thyroid nodule.  Patient was diagnosed with Hashimoto's thyroiditis 2019.  Has not required thyroid medication.  Denies dysphagia.  Reports increased clearing of throat with history of reflux.  Denies radiation exposure.  TSH 1.5, free T4 1.5 peroxidase antibody 887.  Thyroid ultrasound March 2022: Right lobe 5.9 x 1.6 x 2.1 cm, no nodules noted.  Left lobe 5.1 x 1.9 x 1.7 cm with mid 11 x 6 x 8 mm hypoechoic nodule.  No suspicious lymph nodes. I have reviewed all old and new data and available images.

## 2022-04-14 NOTE — CONSULT LETTER
[Dear  ___] : Dear  [unfilled], [Consult Letter:] : I had the pleasure of evaluating your patient, [unfilled]. [Please see my note below.] : Please see my note below. [Consult Closing:] : Thank you very much for allowing me to participate in the care of this patient.  If you have any questions, please do not hesitate to contact me. [Sincerely,] : Sincerely, [FreeTextEntry2] : Dr. Stella Coronado [FreeTextEntry3] : Alyssa Pimentel MD, FACS\par Assistant Professor of Surgery and Otolaryngology\par [facility]\par

## 2022-04-14 NOTE — PROCEDURE
[FreeTextEntry1] : Ultrasound-guided thyroid FNA [FreeTextEntry2] : Left nodule 11 mm [FreeTextEntry3] : Patient for thyroid biopsy. Risks benefits and alternatives discussed including bleeding, infection, swelling and need for repeat biopsy. Questions answered.\par Consent obtained, timeout taken.\par \par Patient supine.\par No anesthetic used. \par Nodule localized with US guidance\par Sterile technique with Betadine skin prep.\par 22-gauge needle under ultrasound guidance with a single pass.\par Slides prepared sent to cytology.\par \par Post procedure:\par Hemostasis obtained, patient tolerated well, no complications.\par Post procedure instructions given.\par

## 2022-04-14 NOTE — ASSESSMENT
[FreeTextEntry1] : Patient with history of Hashimoto's thyroiditis and recently noted left thyroid nodule.  I discussed observation versus biopsy.  Patient is anxious and would like to undergo a biopsy to rule out malignancy.  Ultrasound-guided fine-needle aspiration performed in the office today.  Please see separate procedure note.  If cytology is benign, I have recommended a repeat ultrasound September 2022, RTO 6 months.  I have answered her questions to the best my ability.

## 2022-04-14 NOTE — PHYSICAL EXAM
[de-identified] : no cervical or supraclavicular adenopathy, trachea midline, thyroid without enlargement or palpable mass [Normal] : no neck adenopathy [de-identified] : Skin:  normal appearance.  no rash, nodules, vesicles, or erythema,\par Musculoskeletal:  full range of motion and no deformities appreciated\par Neurological:  grossly intact\par Psychiatric:  oriented to person, place and time with appropriate affect

## 2022-04-14 NOTE — HISTORY OF PRESENT ILLNESS
[de-identified] : Patient referred by Dr. Coronado for evaluation of left thyroid nodule.  Patient was diagnosed with Hashimoto's thyroiditis 2019.  Has not required thyroid medication.  Denies dysphagia.  Reports increased clearing of throat with history of reflux.  Denies radiation exposure.  TSH 1.5, free T4 1.5 peroxidase antibody 887.  Thyroid ultrasound March 2022: Right lobe 5.9 x 1.6 x 2.1 cm, no nodules noted.  Left lobe 5.1 x 1.9 x 1.7 cm with mid 11 x 6 x 8 mm hypoechoic nodule.  No suspicious lymph nodes. I have reviewed all old and new data and available images.

## 2022-04-14 NOTE — PHYSICAL EXAM
[de-identified] : no cervical or supraclavicular adenopathy, trachea midline, thyroid without enlargement or palpable mass [Normal] : no neck adenopathy [de-identified] : Skin:  normal appearance.  no rash, nodules, vesicles, or erythema,\par Musculoskeletal:  full range of motion and no deformities appreciated\par Neurological:  grossly intact\par Psychiatric:  oriented to person, place and time with appropriate affect

## 2022-04-16 ENCOUNTER — OUTPATIENT (OUTPATIENT)
Dept: OUTPATIENT SERVICES | Facility: HOSPITAL | Age: 42
LOS: 1 days | End: 2022-04-16
Payer: COMMERCIAL

## 2022-04-16 ENCOUNTER — APPOINTMENT (OUTPATIENT)
Dept: ULTRASOUND IMAGING | Facility: HOSPITAL | Age: 42
End: 2022-04-16
Payer: COMMERCIAL

## 2022-04-16 DIAGNOSIS — R31.29 OTHER MICROSCOPIC HEMATURIA: ICD-10-CM

## 2022-04-16 PROCEDURE — 76770 US EXAM ABDO BACK WALL COMP: CPT | Mod: 26

## 2022-04-16 PROCEDURE — 76770 US EXAM ABDO BACK WALL COMP: CPT

## 2022-04-18 ENCOUNTER — NON-APPOINTMENT (OUTPATIENT)
Age: 42
End: 2022-04-18

## 2022-05-18 ENCOUNTER — APPOINTMENT (OUTPATIENT)
Dept: OBGYN | Facility: CLINIC | Age: 42
End: 2022-05-18
Payer: COMMERCIAL

## 2022-05-18 VITALS
BODY MASS INDEX: 27 KG/M2 | SYSTOLIC BLOOD PRESSURE: 100 MMHG | WEIGHT: 172 LBS | OXYGEN SATURATION: 99 % | TEMPERATURE: 97.2 F | DIASTOLIC BLOOD PRESSURE: 72 MMHG | HEIGHT: 67 IN | HEART RATE: 71 BPM

## 2022-05-18 DIAGNOSIS — N92.0 EXCESSIVE AND FREQUENT MENSTRUATION WITH REGULAR CYCLE: ICD-10-CM

## 2022-05-18 DIAGNOSIS — Z30.013 ENCOUNTER FOR INITIAL PRESCRIPTION OF INJECTABLE CONTRACEPTIVE: ICD-10-CM

## 2022-05-18 PROCEDURE — 99213 OFFICE O/P EST LOW 20 MIN: CPT | Mod: 25

## 2022-05-18 PROCEDURE — 96372 THER/PROPH/DIAG INJ SC/IM: CPT

## 2022-05-18 RX ORDER — FERROUS SULFATE 325(65) MG
325 (65 FE) TABLET ORAL
Qty: 180 | Refills: 0 | Status: DISCONTINUED | COMMUNITY
Start: 2020-12-21 | End: 2022-05-18

## 2022-05-18 RX ORDER — MEDROXYPROGESTERONE ACETATE 150 MG/ML
150 INJECTION, SUSPENSION INTRAMUSCULAR
Qty: 0 | Refills: 0 | Status: COMPLETED | OUTPATIENT
Start: 2022-05-18

## 2022-05-18 RX ADMIN — MEDROXYPROGESTERONE ACETATE 0 MG/ML: 150 INJECTION, SUSPENSION INTRAMUSCULAR at 00:00

## 2022-05-18 NOTE — PHYSICAL EXAM
[Chaperone Present] : A chaperone was present in the examining room during all aspects of the physical examination [No Lesions] : no genitalia lesions [Labia Majora] : labia major [Labia Minora] : labia minora [Normal] : clitoris [Heavy] : there was heavy vaginal bleeding [Soft] :  the cervix was soft [Normal Position] : in a normal position [Uterine Adnexae] : were not tender and not enlarged [FreeTextEntry1] : KATIE Gallegos [Labia Majora Erythema] : no erythema of the labia majora [Labia Minora Erythema] : no erythema of the labia minora [IUD String] : did not have an IUD string protruding out [Motion Tenderness] : there was no cervical motion tenderness [Tenderness] : nontender [Enlarged ___ wks] : not enlarged [Mass ___ cm] : no uterine mass was palpated [Adnexa Tenderness] : were not tender [Ovarian Mass (___ Cm)] : there were no adnexal masses

## 2022-05-18 NOTE — CHIEF COMPLAINT
[Urgent Visit] : Urgent Visit [FreeTextEntry1] : Light vaginal bleeding for 3 weeks S/P Depo Provera 12/2021 in [place] with amenorrhea for 3 months Depo Provera taken to have amenorrhea while traveling  Not sexually active  + Paragard IUD X 7 years + heavy menses with regular cycle last year Uses cervical cap

## 2022-05-30 LAB
BASOPHILS # BLD AUTO: 0.03 K/UL
BASOPHILS NFR BLD AUTO: 0.6 %
EOSINOPHIL # BLD AUTO: 0.12 K/UL
EOSINOPHIL NFR BLD AUTO: 2.3 %
FERRITIN SERPL-MCNC: 11 NG/ML
HCT VFR BLD CALC: 40.4 %
HGB BLD-MCNC: 12.9 G/DL
IMM GRANULOCYTES NFR BLD AUTO: 0.4 %
IRON SERPL-MCNC: 69 UG/DL
LYMPHOCYTES # BLD AUTO: 1.63 K/UL
LYMPHOCYTES NFR BLD AUTO: 31.3 %
MAN DIFF?: NORMAL
MCHC RBC-ENTMCNC: 26 PG
MCHC RBC-ENTMCNC: 31.9 GM/DL
MCV RBC AUTO: 81.3 FL
MONOCYTES # BLD AUTO: 0.58 K/UL
MONOCYTES NFR BLD AUTO: 11.2 %
NEUTROPHILS # BLD AUTO: 2.82 K/UL
NEUTROPHILS NFR BLD AUTO: 54.2 %
PLATELET # BLD AUTO: 317 K/UL
PROGEST SERPL-MCNC: 0.3 NG/ML
RBC # BLD: 4.97 M/UL
RBC # FLD: 14.1 %
WBC # FLD AUTO: 5.2 K/UL

## 2022-06-15 ENCOUNTER — APPOINTMENT (OUTPATIENT)
Dept: OBGYN | Facility: CLINIC | Age: 42
End: 2022-06-15

## 2022-07-07 ENCOUNTER — OUTPATIENT (OUTPATIENT)
Dept: OUTPATIENT SERVICES | Facility: HOSPITAL | Age: 42
LOS: 1 days | End: 2022-07-07
Payer: COMMERCIAL

## 2022-07-07 ENCOUNTER — RESULT REVIEW (OUTPATIENT)
Age: 42
End: 2022-07-07

## 2022-07-07 ENCOUNTER — APPOINTMENT (OUTPATIENT)
Dept: ULTRASOUND IMAGING | Facility: HOSPITAL | Age: 42
End: 2022-07-07

## 2022-07-07 DIAGNOSIS — N92.0 EXCESSIVE AND FREQUENT MENSTRUATION WITH REGULAR CYCLE: ICD-10-CM

## 2022-07-07 PROCEDURE — 76830 TRANSVAGINAL US NON-OB: CPT | Mod: 26

## 2022-07-07 PROCEDURE — 76856 US EXAM PELVIC COMPLETE: CPT | Mod: 26,59

## 2022-07-07 PROCEDURE — 76856 US EXAM PELVIC COMPLETE: CPT

## 2022-07-07 PROCEDURE — 76830 TRANSVAGINAL US NON-OB: CPT

## 2022-08-24 ENCOUNTER — APPOINTMENT (OUTPATIENT)
Dept: OBGYN | Facility: CLINIC | Age: 42
End: 2022-08-24

## 2022-09-22 ENCOUNTER — APPOINTMENT (OUTPATIENT)
Dept: ULTRASOUND IMAGING | Facility: HOSPITAL | Age: 42
End: 2022-09-22

## 2022-09-22 ENCOUNTER — OUTPATIENT (OUTPATIENT)
Dept: OUTPATIENT SERVICES | Facility: HOSPITAL | Age: 42
LOS: 1 days | End: 2022-09-22
Payer: COMMERCIAL

## 2022-09-22 DIAGNOSIS — E06.3 AUTOIMMUNE THYROIDITIS: ICD-10-CM

## 2022-09-22 PROCEDURE — 76536 US EXAM OF HEAD AND NECK: CPT

## 2022-09-22 PROCEDURE — 76536 US EXAM OF HEAD AND NECK: CPT | Mod: 26

## 2022-10-13 ENCOUNTER — APPOINTMENT (OUTPATIENT)
Dept: SURGERY | Facility: CLINIC | Age: 42
End: 2022-10-13

## 2022-10-13 PROCEDURE — 99213 OFFICE O/P EST LOW 20 MIN: CPT

## 2022-10-13 NOTE — ASSESSMENT
[FreeTextEntry1] : Patient with history of Hashimoto's thyroiditis and recently noted left thyroid nodule.    Ultrasound-guided fine-needle aspiration   benign, stable on follow-up  ultrasound September 2022, request repeat ultrasound March 2023 RTO 6 months.  I have answered her questions to the best my ability.

## 2022-10-13 NOTE — PHYSICAL EXAM
[de-identified] : no cervical or supraclavicular adenopathy, trachea midline, thyroid without enlargement or palpable mass [Normal] : no neck adenopathy [de-identified] : Skin:  normal appearance.  no rash, nodules, vesicles, or erythema,\par Musculoskeletal:  full range of motion and no deformities appreciated\par Neurological:  grossly intact\par Psychiatric:  oriented to person, place and time with appropriate affect

## 2022-10-13 NOTE — HISTORY OF PRESENT ILLNESS
[de-identified] : Patient referred by Dr. Coronado for evaluation of left thyroid nodule.  Patient was diagnosed with Hashimoto's thyroiditis 2019.  Has not required thyroid medication.  Denies dysphagia.  Reports increased clearing of throat with history of reflux.  Denies radiation exposure.  TSH 1.5, free T4 1.5 peroxidase antibody 887.  Thyroid ultrasound March 2022: Right lobe 5.9 x 1.6 x 2.1 cm, no nodules noted.  Left lobe 5.1 x 1.9 x 1.7 cm with mid 11 x 6 x 8 mm hypoechoic nodule.  No suspicious lymph nodes.  Biopsy left nodule April 2022: Benign.  Follow-up ultrasound September 2022 with stable nodules.  No suspicious findings.  I have reviewed all old and new data and available images.

## 2022-10-17 ENCOUNTER — LABORATORY RESULT (OUTPATIENT)
Age: 42
End: 2022-10-17

## 2022-10-17 ENCOUNTER — APPOINTMENT (OUTPATIENT)
Dept: OBGYN | Facility: CLINIC | Age: 42
End: 2022-10-17

## 2022-10-17 VITALS
BODY MASS INDEX: 27 KG/M2 | HEIGHT: 67 IN | WEIGHT: 172 LBS | RESPIRATION RATE: 16 BRPM | OXYGEN SATURATION: 99 % | TEMPERATURE: 97.2 F | HEART RATE: 69 BPM | SYSTOLIC BLOOD PRESSURE: 105 MMHG | DIASTOLIC BLOOD PRESSURE: 75 MMHG

## 2022-10-17 DIAGNOSIS — Z30.42 ENCOUNTER FOR SURVEILLANCE OF INJECTABLE CONTRACEPTIVE: ICD-10-CM

## 2022-10-17 DIAGNOSIS — N92.1 EXCESSIVE AND FREQUENT MENSTRUATION WITH IRREGULAR CYCLE: ICD-10-CM

## 2022-10-17 LAB
HCG UR QL: NEGATIVE
QUALITY CONTROL: YES

## 2022-10-17 PROCEDURE — 99396 PREV VISIT EST AGE 40-64: CPT | Mod: 25

## 2022-10-17 PROCEDURE — 58301 REMOVE INTRAUTERINE DEVICE: CPT

## 2022-10-17 NOTE — PHYSICAL EXAM
[Chaperone Present] : A chaperone was present in the examining room during all aspects of the physical examination [FreeTextEntry1] : KATIE Devlin [Appropriately responsive] : appropriately responsive [Alert] : alert [No Acute Distress] : no acute distress [No Lymphadenopathy] : no lymphadenopathy [Non-tender] : non-tender [Non-distended] : non-distended [No HSM] : No HSM [No Lesions] : no lesions [No Mass] : no mass [Oriented x3] : oriented x3 [Examination Of The Breasts] : a normal appearance [No Discharge] : no discharge [No Masses] : no breast masses were palpable [Labia Majora] : normal [Labia Minora] : normal [No Bleeding] : There was no active vaginal bleeding [Soft] : soft [Normal] : normal [Normal Position] : in a normal position [Tenderness] : nontender [Enlarged ___ wks] : not enlarged [Mass ___ cm] : no uterine mass was palpated [Uterine Adnexae] : normal

## 2022-10-17 NOTE — PROCEDURE
[IUD Removal] : intrauterine device (IUD) removal [Menorrhagia] : menorrhagia [Risks] : risks [Benefits] : benefits [Alternatives] : alternatives [Patient] : patient [Speculum Placed] : speculum placed [Strings Visualized] : strings visualized [Cultured] : specimen sent for cultures [Tolerated Well] : Patient tolerated the procedure well [No Complications] : no complications [Heavy Vaginal Bleeding] : for heavy vaginal bleeding [Pelvic Pain] : for pelvic pain [___ Month(s)] : in [unfilled] month(s)

## 2022-10-17 NOTE — HISTORY OF PRESENT ILLNESS
[Y] : Patient uses contraception [DepoProvera] : uses depo-medroxyprogesterone [Intravaginal Ring] : uses the intravaginal ring [LMPDate] : 9/13/22 [[facility]] : 2 [[facility] InstitutexLiving] : 2 [PGHxABInduced] : 3 [PGxEctopic] : 1 [FreeTextEntry1] : C/S X 2

## 2022-10-24 LAB
A VAGINAE DNA VAG QL NAA+PROBE: NORMAL
BVAB2 DNA VAG QL NAA+PROBE: NORMAL
C KRUSEI DNA VAG QL NAA+PROBE: NEGATIVE
C TRACH RRNA SPEC QL NAA+PROBE: NEGATIVE
CYTOLOGY CVX/VAG DOC THIN PREP: NORMAL
HPV HIGH+LOW RISK DNA PNL CVX: NOT DETECTED
MEGA1 DNA VAG QL NAA+PROBE: NORMAL
N GONORRHOEA RRNA SPEC QL NAA+PROBE: NEGATIVE
T VAGINALIS RRNA SPEC QL NAA+PROBE: NEGATIVE

## 2022-11-17 DIAGNOSIS — Z00.00 ENCOUNTER FOR GENERAL ADULT MEDICAL EXAMINATION W/OUT ABNORMAL FINDINGS: ICD-10-CM

## 2022-11-28 ENCOUNTER — APPOINTMENT (OUTPATIENT)
Dept: OBGYN | Facility: CLINIC | Age: 42
End: 2022-11-28

## 2023-01-19 ENCOUNTER — OUTPATIENT (OUTPATIENT)
Dept: OUTPATIENT SERVICES | Facility: HOSPITAL | Age: 43
LOS: 1 days | End: 2023-01-19
Payer: COMMERCIAL

## 2023-01-19 ENCOUNTER — RESULT REVIEW (OUTPATIENT)
Age: 43
End: 2023-01-19

## 2023-01-19 ENCOUNTER — APPOINTMENT (OUTPATIENT)
Dept: MAMMOGRAPHY | Facility: HOSPITAL | Age: 43
End: 2023-01-19
Payer: COMMERCIAL

## 2023-01-19 DIAGNOSIS — Z00.8 ENCOUNTER FOR OTHER GENERAL EXAMINATION: ICD-10-CM

## 2023-01-19 PROCEDURE — 77067 SCR MAMMO BI INCL CAD: CPT | Mod: 26

## 2023-01-19 PROCEDURE — 77063 BREAST TOMOSYNTHESIS BI: CPT

## 2023-01-19 PROCEDURE — 77067 SCR MAMMO BI INCL CAD: CPT

## 2023-04-17 ENCOUNTER — OUTPATIENT (OUTPATIENT)
Dept: OUTPATIENT SERVICES | Facility: HOSPITAL | Age: 43
LOS: 1 days | End: 2023-04-17
Payer: COMMERCIAL

## 2023-04-17 ENCOUNTER — RESULT REVIEW (OUTPATIENT)
Age: 43
End: 2023-04-17

## 2023-04-17 ENCOUNTER — APPOINTMENT (OUTPATIENT)
Dept: ULTRASOUND IMAGING | Facility: HOSPITAL | Age: 43
End: 2023-04-17
Payer: COMMERCIAL

## 2023-04-17 DIAGNOSIS — E06.3 AUTOIMMUNE THYROIDITIS: ICD-10-CM

## 2023-04-17 DIAGNOSIS — E04.1 NONTOXIC SINGLE THYROID NODULE: ICD-10-CM

## 2023-04-17 PROCEDURE — 76536 US EXAM OF HEAD AND NECK: CPT | Mod: 26

## 2023-04-17 PROCEDURE — 76536 US EXAM OF HEAD AND NECK: CPT

## 2023-04-20 ENCOUNTER — APPOINTMENT (OUTPATIENT)
Dept: SURGERY | Facility: CLINIC | Age: 43
End: 2023-04-20
Payer: COMMERCIAL

## 2023-04-20 PROCEDURE — 99213 OFFICE O/P EST LOW 20 MIN: CPT

## 2023-04-20 NOTE — HISTORY OF PRESENT ILLNESS
[de-identified] : Patient referred by Dr. Coronado for evaluation of left thyroid nodule.  Patient was diagnosed with Hashimoto's thyroiditis 2019.  Has not required thyroid medication.  Denies dysphagia.  Reports increased clearing of throat with history of reflux.  Denies radiation exposure.  TSH 1.5, free T4 1.5 peroxidase antibody 887.  Thyroid ultrasound March 2022: Right lobe 5.9 x 1.6 x 2.1 cm, no nodules noted.  Left lobe 5.1 x 1.9 x 1.7 cm with mid 11 x 6 x 8 mm hypoechoic nodule.  No suspicious lymph nodes.  Biopsy left nodule April 2022: Benign.  Follow-up ultrasound September 2022 and 4/2023  with stable nodules.  No suspicious findings.  I have reviewed all old and new data and available images.

## 2023-04-20 NOTE — ASSESSMENT
[FreeTextEntry1] : Patient with history of Hashimoto's thyroiditis and recently noted left thyroid nodule.    Ultrasound-guided fine-needle aspiration   benign, stable on follow-up  ultrasound April 2023.  request thyroid US 10/2023 to confirm stability.   RTO 6 months.  I have answered her questions to the best my ability.

## 2023-04-20 NOTE — PHYSICAL EXAM
[de-identified] : no cervical or supraclavicular adenopathy, trachea midline, thyroid without enlargement or palpable mass [Normal] : no neck adenopathy [de-identified] : Skin:  normal appearance.  no rash, nodules, vesicles, or erythema,\par Musculoskeletal:  full range of motion and no deformities appreciated\par Neurological:  grossly intact\par Psychiatric:  oriented to person, place and time with appropriate affect

## 2023-06-19 ENCOUNTER — APPOINTMENT (OUTPATIENT)
Dept: FAMILY MEDICINE | Facility: CLINIC | Age: 43
End: 2023-06-19
Payer: COMMERCIAL

## 2023-06-19 ENCOUNTER — NON-APPOINTMENT (OUTPATIENT)
Age: 43
End: 2023-06-19

## 2023-06-19 VITALS
HEIGHT: 67 IN | HEART RATE: 72 BPM | RESPIRATION RATE: 16 BRPM | BODY MASS INDEX: 27 KG/M2 | SYSTOLIC BLOOD PRESSURE: 110 MMHG | TEMPERATURE: 97.3 F | OXYGEN SATURATION: 98 % | WEIGHT: 172 LBS | DIASTOLIC BLOOD PRESSURE: 78 MMHG

## 2023-06-19 DIAGNOSIS — F41.8 OTHER SPECIFIED ANXIETY DISORDERS: ICD-10-CM

## 2023-06-19 DIAGNOSIS — M79.605 PAIN IN LEFT LEG: ICD-10-CM

## 2023-06-19 DIAGNOSIS — Z86.59 PERSONAL HISTORY OF OTHER MENTAL AND BEHAVIORAL DISORDERS: ICD-10-CM

## 2023-06-19 DIAGNOSIS — Z00.00 ENCOUNTER FOR GENERAL ADULT MEDICAL EXAMINATION W/OUT ABNORMAL FINDINGS: ICD-10-CM

## 2023-06-19 PROCEDURE — 99396 PREV VISIT EST AGE 40-64: CPT | Mod: 25

## 2023-06-19 PROCEDURE — 99214 OFFICE O/P EST MOD 30 MIN: CPT | Mod: 25

## 2023-06-19 RX ORDER — MEDROXYPROGESTERONE ACETATE 150 MG/ML
150 INJECTION, SUSPENSION INTRAMUSCULAR
Qty: 1 | Refills: 3 | Status: COMPLETED | COMMUNITY
Start: 2022-05-18 | End: 2023-06-19

## 2023-06-19 RX ORDER — MEDROXYPROGESTERONE ACETATE 150 MG/ML
150 INJECTION, SUSPENSION INTRAMUSCULAR
Qty: 1 | Refills: 3 | Status: COMPLETED | COMMUNITY
Start: 2022-10-17 | End: 2023-06-19

## 2023-06-19 NOTE — HISTORY OF PRESENT ILLNESS
[FreeTextEntry1] : cc: physical , pain/edema  [de-identified] : Patient presented  physical. She denies CP,SOB,Abd pain, no N,V,C,D.Pt c/o right lE pain on and off, chronic, getting worse, + paraesthesia, no fever, no chills,no abd pain , Denies HA,no N,no V , + edema on and off under the knee

## 2023-06-19 NOTE — ASSESSMENT
[FreeTextEntry1] : Patient was informed and agree to pay co-pay if needed  due to addressing new problems during her today's wellness visit\par

## 2023-06-19 NOTE — REVIEW OF SYSTEMS
[Depression] : depression [Negative] : Heme/Lymph [Chest Pain] : no chest pain [Palpitations] : no palpitations [Leg Claudication] : no leg claudication [Lower Ext Edema] : lower extremity edema [Suicidal] : not suicidal [Insomnia] : no insomnia [Anxiety] : no anxiety [FreeTextEntry9] : leg pain b/l

## 2023-06-19 NOTE — COUNSELING
[Fall prevention counseling provided] : Fall prevention counseling provided [Adequate lighting] : Adequate lighting [No throw rugs] : No throw rugs [Use proper foot wear] : Use proper foot wear [Use recommended devices] : Use recommended devices [Hazards of at-risk alcohol use discussed] : Hazards of at-risk alcohol use discussed [Good understanding] : Patient has a good understanding of lifestyle changes and steps needed to achieve self management goal

## 2023-06-19 NOTE — PHYSICAL EXAM
[No Edema] : there was no peripheral edema [Declined Breast Exam] : declined breast exam  [Alert and Oriented x3] : oriented to person, place, and time [Normal] : no posterior cervical lymphadenopathy and no anterior cervical lymphadenopathy [de-identified] : done by GYN

## 2023-06-19 NOTE — DATA REVIEWED
[FreeTextEntry1] : last blood work d/w the pt at length \par \par EKG - NSR at 72 bpm, no acute ST- T changes

## 2023-06-19 NOTE — HEALTH RISK ASSESSMENT
[Very Good] : ~his/her~  mood as very good [Yes] : Yes [2 - 3 times a week (3 pts)] : 2 - 3  times a week (3 points) [1 or 2 (0 pts)] : 1 or 2 (0 points) [Never (0 pts)] : Never (0 points) [No] : In the past 12 months have you used drugs other than those required for medical reasons? No [No falls in past year] : Patient reported no falls in the past year [1] : 2) Feeling down, depressed, or hopeless for several days (1) [Patient reported mammogram was normal] : Patient reported mammogram was normal [None] : None [With Family] : lives with family [Employed] : employed [College] : College [] :  [# Of Children ___] : has [unfilled] children [Sexually Active] : sexually active [Fully functional (bathing, dressing, toileting, transferring, walking, feeding)] : Fully functional (bathing, dressing, toileting, transferring, walking, feeding) [Fully functional (using the telephone, shopping, preparing meals, housekeeping, doing laundry, using] : Fully functional and needs no help or supervision to perform IADLs (using the telephone, shopping, preparing meals, housekeeping, doing laundry, using transportation, managing medications and managing finances) [Smoke Detector] : smoke detector [Carbon Monoxide Detector] : carbon monoxide detector [Seat Belt] :  uses seat belt [Sunscreen] : uses sunscreen [With Patient/Caregiver] : , with patient/caregiver [Aggressive treatment] : aggressive treatment [Former] : Former [FreeTextEntry1] : none [de-identified] : no [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

## 2023-06-20 ENCOUNTER — RESULT REVIEW (OUTPATIENT)
Age: 43
End: 2023-06-20

## 2023-06-20 LAB
25(OH)D3 SERPL-MCNC: 22.1 NG/ML
ALBUMIN SERPL ELPH-MCNC: 4.4 G/DL
ALP BLD-CCNC: 75 U/L
ALT SERPL-CCNC: 7 U/L
ANION GAP SERPL CALC-SCNC: 11 MMOL/L
APPEARANCE: CLEAR
AST SERPL-CCNC: 15 U/L
BILIRUB SERPL-MCNC: 0.2 MG/DL
BILIRUBIN URINE: NEGATIVE
BLOOD URINE: NEGATIVE
BUN SERPL-MCNC: 16 MG/DL
CALCIUM SERPL-MCNC: 9.5 MG/DL
CHLORIDE SERPL-SCNC: 106 MMOL/L
CHOLEST SERPL-MCNC: 201 MG/DL
CO2 SERPL-SCNC: 21 MMOL/L
COLOR: YELLOW
CREAT SERPL-MCNC: 0.61 MG/DL
CREAT SPEC-SCNC: 53 MG/DL
EGFR: 115 ML/MIN/1.73M2
ESTIMATED AVERAGE GLUCOSE: 108 MG/DL
FERRITIN SERPL-MCNC: 6 NG/ML
FOLATE SERPL-MCNC: 13.3 NG/ML
GLUCOSE QUALITATIVE U: NEGATIVE MG/DL
GLUCOSE SERPL-MCNC: 100 MG/DL
HBA1C MFR BLD HPLC: 5.4 %
HDLC SERPL-MCNC: 67 MG/DL
IRON SATN MFR SERPL: 10 %
IRON SERPL-MCNC: 33 UG/DL
KETONES URINE: NEGATIVE MG/DL
LDLC SERPL CALC-MCNC: 116 MG/DL
LEUKOCYTE ESTERASE URINE: NEGATIVE
MICROALBUMIN 24H UR DL<=1MG/L-MCNC: <1.2 MG/DL
MICROALBUMIN/CREAT 24H UR-RTO: NORMAL MG/G
NITRITE URINE: NEGATIVE
NONHDLC SERPL-MCNC: 134 MG/DL
PH URINE: 5.5
POTASSIUM SERPL-SCNC: 4.4 MMOL/L
PROT SERPL-MCNC: 7 G/DL
PROTEIN URINE: NEGATIVE MG/DL
SODIUM SERPL-SCNC: 138 MMOL/L
SPECIFIC GRAVITY URINE: 1.01
T3FREE SERPL-MCNC: 2.55 PG/ML
T4 FREE SERPL-MCNC: 1.4 NG/DL
TIBC SERPL-MCNC: 341 UG/DL
TRANSFERRIN SERPL-MCNC: 272 MG/DL
TRIGL SERPL-MCNC: 90 MG/DL
TSH SERPL-ACNC: 1.26 UIU/ML
UIBC SERPL-MCNC: 308 UG/DL
UROBILINOGEN URINE: 0.2 MG/DL
VIT B12 SERPL-MCNC: 700 PG/ML

## 2023-06-21 LAB
RBC # BLD: 4.92 M/UL
RETICS # AUTO: 1.3 %
RETICS AGGREG/RBC NFR: 62 K/UL

## 2023-06-22 ENCOUNTER — APPOINTMENT (OUTPATIENT)
Dept: RADIOLOGY | Facility: HOSPITAL | Age: 43
End: 2023-06-22
Payer: COMMERCIAL

## 2023-06-22 ENCOUNTER — OUTPATIENT (OUTPATIENT)
Dept: OUTPATIENT SERVICES | Facility: HOSPITAL | Age: 43
LOS: 1 days | End: 2023-06-22
Payer: COMMERCIAL

## 2023-06-22 DIAGNOSIS — R20.0 ANESTHESIA OF SKIN: ICD-10-CM

## 2023-06-22 PROCEDURE — 72110 X-RAY EXAM L-2 SPINE 4/>VWS: CPT

## 2023-06-22 PROCEDURE — 72110 X-RAY EXAM L-2 SPINE 4/>VWS: CPT | Mod: 26

## 2023-07-04 RX ORDER — MULTIVIT-MIN/FOLIC/VIT K/LYCOP 400-300MCG
50 MCG TABLET ORAL
Qty: 100 | Refills: 1 | Status: ACTIVE | COMMUNITY

## 2023-07-04 RX ORDER — MULTIVIT-MIN/FOLIC/VIT K/LYCOP 400-300MCG
500 TABLET ORAL
Qty: 100 | Refills: 1 | Status: ACTIVE | COMMUNITY

## 2023-07-11 ENCOUNTER — OUTPATIENT (OUTPATIENT)
Dept: OUTPATIENT SERVICES | Facility: HOSPITAL | Age: 43
LOS: 1 days | End: 2023-07-11
Payer: COMMERCIAL

## 2023-07-11 ENCOUNTER — APPOINTMENT (OUTPATIENT)
Dept: ULTRASOUND IMAGING | Facility: HOSPITAL | Age: 43
End: 2023-07-11
Payer: COMMERCIAL

## 2023-07-11 DIAGNOSIS — M79.605 PAIN IN LEFT LEG: ICD-10-CM

## 2023-07-11 PROCEDURE — 93970 EXTREMITY STUDY: CPT | Mod: 26

## 2023-07-11 PROCEDURE — 93970 EXTREMITY STUDY: CPT

## 2023-09-07 ENCOUNTER — APPOINTMENT (OUTPATIENT)
Dept: FAMILY MEDICINE | Facility: CLINIC | Age: 43
End: 2023-09-07
Payer: COMMERCIAL

## 2023-09-07 VITALS
HEART RATE: 74 BPM | OXYGEN SATURATION: 99 % | SYSTOLIC BLOOD PRESSURE: 106 MMHG | TEMPERATURE: 97.2 F | RESPIRATION RATE: 14 BRPM | BODY MASS INDEX: 27 KG/M2 | WEIGHT: 172 LBS | DIASTOLIC BLOOD PRESSURE: 70 MMHG | HEIGHT: 67 IN

## 2023-09-07 DIAGNOSIS — E61.1 IRON DEFICIENCY: ICD-10-CM

## 2023-09-07 DIAGNOSIS — Z23 ENCOUNTER FOR IMMUNIZATION: ICD-10-CM

## 2023-09-07 DIAGNOSIS — E55.9 VITAMIN D DEFICIENCY, UNSPECIFIED: ICD-10-CM

## 2023-09-07 DIAGNOSIS — D64.9 ANEMIA, UNSPECIFIED: ICD-10-CM

## 2023-09-07 DIAGNOSIS — R20.0 ANESTHESIA OF SKIN: ICD-10-CM

## 2023-09-07 PROCEDURE — 90686 IIV4 VACC NO PRSV 0.5 ML IM: CPT

## 2023-09-07 PROCEDURE — 99214 OFFICE O/P EST MOD 30 MIN: CPT | Mod: 25

## 2023-09-07 PROCEDURE — G0008: CPT

## 2023-09-10 PROBLEM — E61.1 IRON DEFICIENCY: Status: ACTIVE | Noted: 2023-09-07

## 2023-09-10 PROBLEM — Z23 NEED FOR INFLUENZA VACCINATION: Status: ACTIVE | Noted: 2023-09-07

## 2023-09-10 PROBLEM — D64.9 ANEMIA: Status: ACTIVE | Noted: 2020-09-18

## 2023-09-10 PROBLEM — R20.0 RIGHT LEG NUMBNESS: Status: ACTIVE | Noted: 2023-06-19

## 2023-09-10 PROBLEM — E55.9 VITAMIN D DEFICIENCY: Status: ACTIVE | Noted: 2020-12-21

## 2023-09-10 LAB
BASOPHILS # BLD AUTO: 0.02 K/UL
BASOPHILS NFR BLD AUTO: 0.4 %
EOSINOPHIL # BLD AUTO: 0.12 K/UL
EOSINOPHIL NFR BLD AUTO: 2.4 %
FERRITIN SERPL-MCNC: 34 NG/ML
HCT VFR BLD CALC: 40.9 %
HGB BLD-MCNC: 12.8 G/DL
IMM GRANULOCYTES NFR BLD AUTO: 0.4 %
IRON SATN MFR SERPL: 16 %
IRON SERPL-MCNC: 58 UG/DL
LYMPHOCYTES # BLD AUTO: 1.66 K/UL
LYMPHOCYTES NFR BLD AUTO: 32.5 %
MAN DIFF?: NORMAL
MCHC RBC-ENTMCNC: 24.4 PG
MCHC RBC-ENTMCNC: 31.3 GM/DL
MCV RBC AUTO: 78.1 FL
MONOCYTES # BLD AUTO: 0.43 K/UL
MONOCYTES NFR BLD AUTO: 8.4 %
NEUTROPHILS # BLD AUTO: 2.85 K/UL
NEUTROPHILS NFR BLD AUTO: 55.9 %
PLATELET # BLD AUTO: 320 K/UL
RBC # BLD: 5.24 M/UL
RBC # FLD: 18.1 %
TIBC SERPL-MCNC: 353 UG/DL
TRANSFERRIN SERPL-MCNC: 276 MG/DL
UIBC SERPL-MCNC: 295 UG/DL
WBC # FLD AUTO: 5.1 K/UL

## 2023-09-10 NOTE — HEALTH RISK ASSESSMENT
[Yes] : Yes [2 - 4 times a month (2 pts)] : 2-4 times a month (2 points) [1 or 2 (0 pts)] : 1 or 2 (0 points)

## 2023-09-10 NOTE — HISTORY OF PRESENT ILLNESS
[FreeTextEntry1] : cc: f/u on her Anemia, flu vacc, right leg  numbness on and off [de-identified] : Pt presented for f/u on her Anemia, she denies CP,SOB,abd pain.She id taking her Fe in Am ,makes her nauseas. Pt c/o chronic right leg numbness on and off, + back pain dania nd off, no weakness,no injury - few months,not better.

## 2023-09-10 NOTE — COUNSELING
[Adequate lighting] : Adequate lighting [Fall prevention counseling provided] : Fall prevention counseling provided [No throw rugs] : No throw rugs [Use proper foot wear] : Use proper foot wear [Use recommended devices] : Use recommended devices [Potential consequences of obesity discussed] : Potential consequences of obesity discussed [Benefits of weight loss discussed] : Benefits of weight loss discussed [Encouraged to maintain food diary] : Encouraged to maintain food diary [Encouraged to increase physical activity] : Encouraged to increase physical activity [Encouraged to use exercise tracking device] : Encouraged to use exercise tracking device [Weigh Self Weekly] : weigh self weekly [Decrease Portions] : decrease portions [____ min/wk Activity] : [unfilled] min/wk activity [Keep Food Diary] : keep food diary [Good understanding] : Patient has a good understanding of lifestyle changes and steps needed to achieve self management goal

## 2023-09-18 ENCOUNTER — APPOINTMENT (OUTPATIENT)
Dept: ORTHOPEDIC SURGERY | Facility: CLINIC | Age: 43
End: 2023-09-18
Payer: COMMERCIAL

## 2023-09-18 ENCOUNTER — NON-APPOINTMENT (OUTPATIENT)
Age: 43
End: 2023-09-18

## 2023-09-18 VITALS
HEIGHT: 67 IN | SYSTOLIC BLOOD PRESSURE: 109 MMHG | WEIGHT: 173 LBS | BODY MASS INDEX: 27.15 KG/M2 | DIASTOLIC BLOOD PRESSURE: 69 MMHG | HEART RATE: 69 BPM

## 2023-09-18 DIAGNOSIS — M54.16 RADICULOPATHY, LUMBAR REGION: ICD-10-CM

## 2023-09-18 PROCEDURE — 99204 OFFICE O/P NEW MOD 45 MIN: CPT

## 2023-09-18 PROCEDURE — 72110 X-RAY EXAM L-2 SPINE 4/>VWS: CPT

## 2023-10-14 ENCOUNTER — OUTPATIENT (OUTPATIENT)
Dept: OUTPATIENT SERVICES | Facility: HOSPITAL | Age: 43
LOS: 1 days | End: 2023-10-14
Payer: COMMERCIAL

## 2023-10-14 ENCOUNTER — APPOINTMENT (OUTPATIENT)
Dept: ULTRASOUND IMAGING | Facility: CLINIC | Age: 43
End: 2023-10-14
Payer: COMMERCIAL

## 2023-10-14 DIAGNOSIS — E04.1 NONTOXIC SINGLE THYROID NODULE: ICD-10-CM

## 2023-10-14 DIAGNOSIS — E06.3 AUTOIMMUNE THYROIDITIS: ICD-10-CM

## 2023-10-14 PROCEDURE — 76536 US EXAM OF HEAD AND NECK: CPT

## 2023-10-14 PROCEDURE — 76536 US EXAM OF HEAD AND NECK: CPT | Mod: 26

## 2023-10-16 ENCOUNTER — APPOINTMENT (OUTPATIENT)
Dept: ORTHOPEDIC SURGERY | Facility: CLINIC | Age: 43
End: 2023-10-16

## 2023-10-17 ENCOUNTER — APPOINTMENT (OUTPATIENT)
Dept: SURGERY | Facility: CLINIC | Age: 43
End: 2023-10-17
Payer: COMMERCIAL

## 2023-10-17 PROCEDURE — 99213 OFFICE O/P EST LOW 20 MIN: CPT

## 2023-11-21 ENCOUNTER — NON-APPOINTMENT (OUTPATIENT)
Age: 43
End: 2023-11-21

## 2023-11-21 DIAGNOSIS — E04.1 NONTOXIC SINGLE THYROID NODULE: ICD-10-CM

## 2024-01-17 ENCOUNTER — APPOINTMENT (OUTPATIENT)
Dept: OBGYN | Facility: CLINIC | Age: 44
End: 2024-01-17
Payer: COMMERCIAL

## 2024-01-17 VITALS
BODY MASS INDEX: 27.15 KG/M2 | OXYGEN SATURATION: 98 % | TEMPERATURE: 97.4 F | HEART RATE: 89 BPM | SYSTOLIC BLOOD PRESSURE: 118 MMHG | HEIGHT: 67 IN | DIASTOLIC BLOOD PRESSURE: 80 MMHG | WEIGHT: 173 LBS

## 2024-01-17 DIAGNOSIS — Z30.09 ENCOUNTER FOR OTHER GENERAL COUNSELING AND ADVICE ON CONTRACEPTION: ICD-10-CM

## 2024-01-17 DIAGNOSIS — Z01.419 ENCOUNTER FOR GYNECOLOGICAL EXAMINATION (GENERAL) (ROUTINE) W/OUT ABNORMAL FINDINGS: ICD-10-CM

## 2024-01-17 LAB
HCG UR QL: NEGATIVE
QUALITY CONTROL: YES

## 2024-01-17 PROCEDURE — 99396 PREV VISIT EST AGE 40-64: CPT

## 2024-01-17 RX ORDER — FERROUS SULFATE 325(65) MG
325 (65 FE) TABLET ORAL DAILY
Qty: 100 | Refills: 1 | Status: DISCONTINUED | COMMUNITY
Start: 2023-06-22 | End: 2024-01-17

## 2024-01-17 NOTE — HISTORY OF PRESENT ILLNESS
[Y] : Patient uses contraception [Withdrawal] : uses withdrawal [Timing] : uses menstrual timing methods [LMPDate] : 12/25/23 [MensesFreq] : 28 [[facility]] : 2 [[facility]Living] : 2 [PGHxABSpont] : 2 [PGxEctopic] : 1 [FreeTextEntry1] : LEENA, C/S

## 2024-01-17 NOTE — COUNSELING
[Nutrition/ Exercise/ Weight Management] : nutrition, exercise, weight management [Vitamins/Supplements] : vitamins/supplements [Smoking Cessation] : smoking cessation [Breast Self Exam] : breast self exam [Contraception/ Emergency Contraception/ Safe Sexual Practices] : contraception, emergency contraception, safe sexual practices

## 2024-01-17 NOTE — PHYSICAL EXAM
[Chaperone Present] : A chaperone was present in the examining room during all aspects of the physical examination [FreeTextEntry1] : KATIE VALENZUELA [Appropriately responsive] : appropriately responsive [Alert] : alert [No Acute Distress] : no acute distress [No Lymphadenopathy] : no lymphadenopathy [No Murmurs] : no murmurs [Non-tender] : non-tender [Non-distended] : non-distended [No HSM] : No HSM [No Lesions] : no lesions [No Mass] : no mass [Oriented x3] : oriented x3 [Examination Of The Breasts] : a normal appearance [No Discharge] : no discharge [No Masses] : no breast masses were palpable [Labia Majora] : normal [Labia Minora] : normal [No Bleeding] : There was no active vaginal bleeding [Soft] : soft [Normal] : normal [Normal Position] : in a normal position [Tenderness] : nontender [Enlarged ___ wks] : not enlarged [Mass ___ cm] : no uterine mass was palpated [Uterine Adnexae] : normal

## 2024-01-31 LAB
CYTOLOGY CVX/VAG DOC THIN PREP: NORMAL
HPV HIGH+LOW RISK DNA PNL CVX: NOT DETECTED

## 2024-03-01 ENCOUNTER — RESULT REVIEW (OUTPATIENT)
Age: 44
End: 2024-03-01

## 2024-03-01 ENCOUNTER — OUTPATIENT (OUTPATIENT)
Dept: OUTPATIENT SERVICES | Facility: HOSPITAL | Age: 44
LOS: 1 days | End: 2024-03-01
Payer: COMMERCIAL

## 2024-03-01 ENCOUNTER — APPOINTMENT (OUTPATIENT)
Dept: MAMMOGRAPHY | Facility: HOSPITAL | Age: 44
End: 2024-03-01
Payer: MEDICAID

## 2024-03-01 ENCOUNTER — APPOINTMENT (OUTPATIENT)
Dept: ULTRASOUND IMAGING | Facility: HOSPITAL | Age: 44
End: 2024-03-01
Payer: MEDICAID

## 2024-03-01 DIAGNOSIS — Z01.419 ENCOUNTER FOR GYNECOLOGICAL EXAMINATION (GENERAL) (ROUTINE) WITHOUT ABNORMAL FINDINGS: ICD-10-CM

## 2024-03-01 PROCEDURE — 77063 BREAST TOMOSYNTHESIS BI: CPT | Mod: 26

## 2024-03-01 PROCEDURE — 77067 SCR MAMMO BI INCL CAD: CPT | Mod: 26

## 2024-03-01 PROCEDURE — 77063 BREAST TOMOSYNTHESIS BI: CPT

## 2024-03-01 PROCEDURE — 76641 ULTRASOUND BREAST COMPLETE: CPT | Mod: 26,50

## 2024-03-01 PROCEDURE — 76641 ULTRASOUND BREAST COMPLETE: CPT

## 2024-03-01 PROCEDURE — 77067 SCR MAMMO BI INCL CAD: CPT

## 2024-03-12 DIAGNOSIS — R92.8 OTHER ABNORMAL AND INCONCLUSIVE FINDINGS ON DIAGNOSTIC IMAGING OF BREAST: ICD-10-CM

## 2024-04-16 ENCOUNTER — APPOINTMENT (OUTPATIENT)
Dept: SURGERY | Facility: CLINIC | Age: 44
End: 2024-04-16

## 2024-04-17 ENCOUNTER — APPOINTMENT (OUTPATIENT)
Dept: ULTRASOUND IMAGING | Facility: HOSPITAL | Age: 44
End: 2024-04-17
Payer: MEDICAID

## 2024-04-17 ENCOUNTER — OUTPATIENT (OUTPATIENT)
Dept: OUTPATIENT SERVICES | Facility: HOSPITAL | Age: 44
LOS: 1 days | End: 2024-04-17
Payer: MEDICAID

## 2024-04-17 DIAGNOSIS — E06.3 AUTOIMMUNE THYROIDITIS: ICD-10-CM

## 2024-04-17 PROCEDURE — 76536 US EXAM OF HEAD AND NECK: CPT | Mod: 26

## 2024-04-17 PROCEDURE — 76536 US EXAM OF HEAD AND NECK: CPT

## 2024-04-25 ENCOUNTER — APPOINTMENT (OUTPATIENT)
Dept: SURGERY | Facility: CLINIC | Age: 44
End: 2024-04-25
Payer: MEDICAID

## 2024-04-25 DIAGNOSIS — E04.1 NONTOXIC SINGLE THYROID NODULE: ICD-10-CM

## 2024-04-25 DIAGNOSIS — E06.3 AUTOIMMUNE THYROIDITIS: ICD-10-CM

## 2024-04-25 PROCEDURE — 99213 OFFICE O/P EST LOW 20 MIN: CPT

## 2024-04-25 PROCEDURE — G2211 COMPLEX E/M VISIT ADD ON: CPT | Mod: NC,1L

## 2024-04-25 NOTE — ASSESSMENT
[FreeTextEntry1] : Patient with history of Hashimoto's thyroiditis and left thyroid nodule.  Prior biopsy benign. f/u thyroid ultrasound jono.  will continue to monitor with ultrasound March 2025.  RTO 1 year.  I have answered her questions to the best my ability.

## 2024-04-25 NOTE — PHYSICAL EXAM
[de-identified] : no cervical or supraclavicular adenopathy, trachea midline, thyroid without enlargement or palpable mass [Normal] : no neck adenopathy [de-identified] : Skin:  normal appearance.  no rash, nodules, vesicles, or erythema,\par  Musculoskeletal:  full range of motion and no deformities appreciated\par  Neurological:  grossly intact\par  Psychiatric:  oriented to person, place and time with appropriate affect

## 2024-04-25 NOTE — HISTORY OF PRESENT ILLNESS
[de-identified] : Patient referred by Dr. Coronado for evaluation of left thyroid nodule.  Patient was diagnosed with Hashimoto's thyroiditis 2019.  Has not required thyroid medication.  Denies dysphagia.  Reports increased clearing of throat with history of reflux.  Denies radiation exposure.  TSH 1.5, free T4 1.5 peroxidase antibody 887.  Thyroid ultrasound March 2022: Right lobe 5.9 x 1.6 x 2.1 cm, no nodules noted.  Left lobe 5.1 x 1.9 x 1.7 cm with mid 11 x 6 x 8 mm hypoechoic nodule.  No suspicious lymph nodes.  Biopsy left nodule April 2022: Benign.  Follow-up ultrasound September 2022 and 4/2023 with stable nodules.  No suspicious findings.  Repeat ultrasound 4/2024 stable nodules..  Patient denies recent illness.  I have reviewed all old and new data and available images.

## 2024-07-08 ENCOUNTER — APPOINTMENT (OUTPATIENT)
Dept: OBGYN | Facility: CLINIC | Age: 44
End: 2024-07-08

## 2024-07-10 ENCOUNTER — APPOINTMENT (OUTPATIENT)
Dept: OBGYN | Facility: CLINIC | Age: 44
End: 2024-07-10
Payer: MEDICAID

## 2024-07-10 VITALS
TEMPERATURE: 97.2 F | OXYGEN SATURATION: 99 % | HEIGHT: 67 IN | SYSTOLIC BLOOD PRESSURE: 116 MMHG | BODY MASS INDEX: 27.62 KG/M2 | WEIGHT: 176 LBS | HEART RATE: 79 BPM | DIASTOLIC BLOOD PRESSURE: 72 MMHG

## 2024-07-10 DIAGNOSIS — R92.8 OTHER ABNORMAL AND INCONCLUSIVE FINDINGS ON DIAGNOSTIC IMAGING OF BREAST: ICD-10-CM

## 2024-07-10 DIAGNOSIS — Z11.3 ENCOUNTER FOR SCREENING FOR INFECTIONS WITH A PREDOMINANTLY SEXUAL MODE OF TRANSMISSION: ICD-10-CM

## 2024-07-10 LAB
HCG UR QL: NEGATIVE
QUALITY CONTROL: YES

## 2024-07-10 PROCEDURE — 99213 OFFICE O/P EST LOW 20 MIN: CPT

## 2024-07-15 LAB
C TRACH RRNA SPEC QL NAA+PROBE: NOT DETECTED
CANDIDA VAG CYTO: DETECTED
G VAGINALIS+PREV SP MTYP VAG QL MICRO: NOT DETECTED
HBV SURFACE AG SER QL: NONREACTIVE
HCV S/CO RATIO: 0.12 S/CO
HSV 1+2 IGG SER IA-IMP: POSITIVE
HSV 1+2 IGG SER IA-IMP: POSITIVE
N GONORRHOEA RRNA SPEC QL NAA+PROBE: NOT DETECTED
SOURCE AMPLIFICATION: NORMAL
T PALLIDUM AB SER QL IA: NEGATIVE
T VAGINALIS VAG QL WET PREP: NOT DETECTED

## 2024-09-12 ENCOUNTER — NON-APPOINTMENT (OUTPATIENT)
Age: 44
End: 2024-09-12

## 2024-09-12 ENCOUNTER — RESULT REVIEW (OUTPATIENT)
Age: 44
End: 2024-09-12

## 2024-09-12 ENCOUNTER — APPOINTMENT (OUTPATIENT)
Dept: ULTRASOUND IMAGING | Facility: HOSPITAL | Age: 44
End: 2024-09-12
Payer: MEDICAID

## 2024-09-12 ENCOUNTER — OUTPATIENT (OUTPATIENT)
Dept: OUTPATIENT SERVICES | Facility: HOSPITAL | Age: 44
LOS: 1 days | End: 2024-09-12
Payer: MEDICAID

## 2024-09-12 ENCOUNTER — APPOINTMENT (OUTPATIENT)
Dept: FAMILY MEDICINE | Facility: CLINIC | Age: 44
End: 2024-09-12
Payer: MEDICAID

## 2024-09-12 VITALS
RESPIRATION RATE: 16 BRPM | DIASTOLIC BLOOD PRESSURE: 80 MMHG | HEIGHT: 67 IN | HEART RATE: 89 BPM | OXYGEN SATURATION: 99 % | WEIGHT: 180 LBS | BODY MASS INDEX: 28.25 KG/M2 | TEMPERATURE: 97.9 F | SYSTOLIC BLOOD PRESSURE: 122 MMHG

## 2024-09-12 DIAGNOSIS — Z00.00 ENCOUNTER FOR GENERAL ADULT MEDICAL EXAMINATION W/OUT ABNORMAL FINDINGS: ICD-10-CM

## 2024-09-12 DIAGNOSIS — R92.8 OTHER ABNORMAL AND INCONCLUSIVE FINDINGS ON DIAGNOSTIC IMAGING OF BREAST: ICD-10-CM

## 2024-09-12 DIAGNOSIS — E55.9 VITAMIN D DEFICIENCY, UNSPECIFIED: ICD-10-CM

## 2024-09-12 DIAGNOSIS — E66.3 OVERWEIGHT: ICD-10-CM

## 2024-09-12 DIAGNOSIS — T18.9XXA FOREIGN BODY OF ALIMENTARY TRACT, PART UNSPECIFIED, INITIAL ENCOUNTER: ICD-10-CM

## 2024-09-12 DIAGNOSIS — E06.3 AUTOIMMUNE THYROIDITIS: ICD-10-CM

## 2024-09-12 DIAGNOSIS — Z23 ENCOUNTER FOR IMMUNIZATION: ICD-10-CM

## 2024-09-12 PROCEDURE — G0008: CPT

## 2024-09-12 PROCEDURE — 99214 OFFICE O/P EST MOD 30 MIN: CPT | Mod: 25

## 2024-09-12 PROCEDURE — 99396 PREV VISIT EST AGE 40-64: CPT | Mod: 25

## 2024-09-12 PROCEDURE — 90656 IIV3 VACC NO PRSV 0.5 ML IM: CPT

## 2024-09-12 PROCEDURE — 93000 ELECTROCARDIOGRAM COMPLETE: CPT

## 2024-09-12 PROCEDURE — 76642 ULTRASOUND BREAST LIMITED: CPT | Mod: 26,LT

## 2024-09-12 PROCEDURE — 76642 ULTRASOUND BREAST LIMITED: CPT

## 2024-09-13 ENCOUNTER — RESULT REVIEW (OUTPATIENT)
Age: 44
End: 2024-09-13

## 2024-09-13 ENCOUNTER — OUTPATIENT (OUTPATIENT)
Dept: OUTPATIENT SERVICES | Facility: HOSPITAL | Age: 44
LOS: 1 days | End: 2024-09-13
Payer: MEDICAID

## 2024-09-13 ENCOUNTER — APPOINTMENT (OUTPATIENT)
Dept: RADIOLOGY | Facility: HOSPITAL | Age: 44
End: 2024-09-13
Payer: MEDICAID

## 2024-09-13 DIAGNOSIS — T18.9XXA FOREIGN BODY OF ALIMENTARY TRACT, PART UNSPECIFIED, INITIAL ENCOUNTER: ICD-10-CM

## 2024-09-13 LAB
25(OH)D3 SERPL-MCNC: 25.8 NG/ML
ALBUMIN SERPL ELPH-MCNC: 4.2 G/DL
ALP BLD-CCNC: 80 U/L
ALT SERPL-CCNC: 9 U/L
ANION GAP SERPL CALC-SCNC: 10 MMOL/L
AST SERPL-CCNC: 19 U/L
BASOPHILS # BLD AUTO: 0.04 K/UL
BASOPHILS NFR BLD AUTO: 0.8 %
BILIRUB SERPL-MCNC: 0.3 MG/DL
BUN SERPL-MCNC: 17 MG/DL
CALCIUM SERPL-MCNC: 9.6 MG/DL
CHLORIDE SERPL-SCNC: 105 MMOL/L
CHOLEST SERPL-MCNC: 214 MG/DL
CO2 SERPL-SCNC: 23 MMOL/L
CREAT SERPL-MCNC: 0.65 MG/DL
EGFR: 113 ML/MIN/1.73M2
EOSINOPHIL # BLD AUTO: 0.1 K/UL
EOSINOPHIL NFR BLD AUTO: 2 %
GLUCOSE SERPL-MCNC: 94 MG/DL
HCT VFR BLD CALC: 37.3 %
HDLC SERPL-MCNC: 65 MG/DL
HGB BLD-MCNC: 11.4 G/DL
IMM GRANULOCYTES NFR BLD AUTO: 0.4 %
LDLC SERPL CALC-MCNC: 129 MG/DL
LYMPHOCYTES # BLD AUTO: 1.39 K/UL
LYMPHOCYTES NFR BLD AUTO: 27.5 %
MAN DIFF?: NORMAL
MCHC RBC-ENTMCNC: 24.2 PG
MCHC RBC-ENTMCNC: 30.6 GM/DL
MCV RBC AUTO: 79 FL
MONOCYTES # BLD AUTO: 0.49 K/UL
MONOCYTES NFR BLD AUTO: 9.7 %
NEUTROPHILS # BLD AUTO: 3.02 K/UL
NEUTROPHILS NFR BLD AUTO: 59.6 %
NONHDLC SERPL-MCNC: 149 MG/DL
PLATELET # BLD AUTO: 336 K/UL
POTASSIUM SERPL-SCNC: 4.7 MMOL/L
PROT SERPL-MCNC: 6.8 G/DL
RBC # BLD: 4.72 M/UL
RBC # FLD: 14.7 %
SODIUM SERPL-SCNC: 138 MMOL/L
T3FREE SERPL-MCNC: 3.02 PG/ML
T4 FREE SERPL-MCNC: 1.3 NG/DL
TRIGL SERPL-MCNC: 111 MG/DL
TSH SERPL-ACNC: 1.35 UIU/ML
WBC # FLD AUTO: 5.06 K/UL

## 2024-09-13 PROCEDURE — 71045 X-RAY EXAM CHEST 1 VIEW: CPT | Mod: 26

## 2024-09-13 PROCEDURE — 74018 RADEX ABDOMEN 1 VIEW: CPT

## 2024-09-13 PROCEDURE — 74018 RADEX ABDOMEN 1 VIEW: CPT | Mod: 26

## 2024-09-13 PROCEDURE — 71045 X-RAY EXAM CHEST 1 VIEW: CPT

## 2024-09-14 PROBLEM — E66.3 OVERWEIGHT: Status: ACTIVE | Noted: 2024-09-12

## 2024-09-14 PROBLEM — T18.9XXA SWALLOWED FOREIGN BODY, INITIAL ENCOUNTER: Status: ACTIVE | Noted: 2024-09-12

## 2024-09-14 NOTE — COUNSELING
[Fall prevention counseling provided] : Fall prevention counseling provided [Adequate lighting] : Adequate lighting [No throw rugs] : No throw rugs [Use proper foot wear] : Use proper foot wear [Use recommended devices] : Use recommended devices [Potential consequences of obesity discussed] : Potential consequences of obesity discussed [Benefits of weight loss discussed] : Benefits of weight loss discussed [Encouraged to maintain food diary] : Encouraged to maintain food diary [Encouraged to increase physical activity] : Encouraged to increase physical activity [Encouraged to use exercise tracking device] : Encouraged to use exercise tracking device [Weigh Self Weekly] : weigh self weekly [Decrease Portions] : decrease portions [____ min/wk Activity] : [unfilled] min/wk activity [Keep Food Diary] : keep food diary [Good understanding] : Patient has a good understanding of disease, goals and obesity follow-up plan [Hazards of at-risk alcohol use discussed] : Hazards of at-risk alcohol use discussed [Strategies to reduce or eliminate alcohol use discussed] : Strategies to reduce or eliminate alcohol use discussed [Support options provided] : Support options provided [Quit Drinking] : Quit Drinking

## 2024-09-14 NOTE — HEALTH RISK ASSESSMENT
[Very Good] : ~his/her~  mood as very good [Yes] : Yes [2 - 3 times a week (3 pts)] : 2 - 3  times a week (3 points) [1 or 2 (0 pts)] : 1 or 2 (0 points) [Never (0 pts)] : Never (0 points) [No] : In the past 12 months have you used drugs other than those required for medical reasons? No [No falls in past year] : Patient reported no falls in the past year [0] : 1) Little interest or pleasure doing things: Not at all (0) [PHQ-2 Negative - No further assessment needed] : PHQ-2 Negative - No further assessment needed [Former] : Former [NO] : No [Patient reported mammogram was abnormal] : Patient reported mammogram was abnormal [None] : None [With Family] : lives with family [Employed] : employed [College] : College [] :  [# Of Children ___] : has [unfilled] children [Sexually Active] : sexually active [Fully functional (bathing, dressing, toileting, transferring, walking, feeding)] : Fully functional (bathing, dressing, toileting, transferring, walking, feeding) [Fully functional (using the telephone, shopping, preparing meals, housekeeping, doing laundry, using] : Fully functional and needs no help or supervision to perform IADLs (using the telephone, shopping, preparing meals, housekeeping, doing laundry, using transportation, managing medications and managing finances) [Smoke Detector] : smoke detector [Carbon Monoxide Detector] : carbon monoxide detector [Seat Belt] :  uses seat belt [Sunscreen] : uses sunscreen [With Patient/Caregiver] : , with patient/caregiver [Aggressive treatment] : aggressive treatment [1] : 2) Feeling down, depressed, or hopeless for several days (1) [FreeTextEntry1] : none [4 or more  times a week (4 pts)] : 4 or more  times a week (4 points) [de-identified] : no [de-identified] : pt drinks 2 beers daily  [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

## 2024-09-14 NOTE — HISTORY OF PRESENT ILLNESS
[FreeTextEntry1] : cc: physical , Flu vacc, swallow Fregin body , weight gain  [de-identified] : Patient presented  physical. She denies CP,SOB,Abd pain, no N,V,C,D. She swallow more than 2 weeks ago, part ot the can  from her beer, she was checking her stool 10 days after did not find the cap,no fever, no chills no abd pain .Recently pt gained weight , not able to loss it.

## 2024-09-14 NOTE — PHYSICAL EXAM
[No Edema] : there was no peripheral edema [Declined Breast Exam] : declined breast exam  [Alert and Oriented x3] : oriented to person, place, and time [Normal] : affect was normal and insight and judgment were intact [de-identified] : done by GYN

## 2024-09-14 NOTE — HISTORY OF PRESENT ILLNESS
[FreeTextEntry1] : cc: physical , Flu vacc, swallow Fregin body , weight gain  [de-identified] : Patient presented  physical. She denies CP,SOB,Abd pain, no N,V,C,D. She swallow more than 2 weeks ago, part ot the can  from her beer, she was checking her stool 10 days after did not find the cap,no fever, no chills no abd pain .Recently pt gained weight , not able to loss it.

## 2024-09-14 NOTE — PHYSICAL EXAM
[No Edema] : there was no peripheral edema [Declined Breast Exam] : declined breast exam  [Alert and Oriented x3] : oriented to person, place, and time [Normal] : affect was normal and insight and judgment were intact [de-identified] : done by GYN

## 2024-09-14 NOTE — REVIEW OF SYSTEMS
[Fever] : no fever [Chills] : no chills [Fatigue] : no fatigue [Recent Change In Weight] : ~T recent weight change [Chest Pain] : no chest pain [Palpitations] : no palpitations [Leg Claudication] : no leg claudication [Lower Ext Edema] : no lower extremity edema [Suicidal] : not suicidal [Anxiety] : no anxiety [Insomnia] : no insomnia [Depression] : no depression [Negative] : Cardiovascular

## 2024-09-14 NOTE — HEALTH RISK ASSESSMENT
[Very Good] : ~his/her~  mood as very good [Yes] : Yes [2 - 3 times a week (3 pts)] : 2 - 3  times a week (3 points) [1 or 2 (0 pts)] : 1 or 2 (0 points) [Never (0 pts)] : Never (0 points) [No] : In the past 12 months have you used drugs other than those required for medical reasons? No [No falls in past year] : Patient reported no falls in the past year [0] : 1) Little interest or pleasure doing things: Not at all (0) [PHQ-2 Negative - No further assessment needed] : PHQ-2 Negative - No further assessment needed [Former] : Former [NO] : No [Patient reported mammogram was abnormal] : Patient reported mammogram was abnormal [None] : None [With Family] : lives with family [Employed] : employed [College] : College [] :  [# Of Children ___] : has [unfilled] children [Sexually Active] : sexually active [Fully functional (bathing, dressing, toileting, transferring, walking, feeding)] : Fully functional (bathing, dressing, toileting, transferring, walking, feeding) [Fully functional (using the telephone, shopping, preparing meals, housekeeping, doing laundry, using] : Fully functional and needs no help or supervision to perform IADLs (using the telephone, shopping, preparing meals, housekeeping, doing laundry, using transportation, managing medications and managing finances) [Smoke Detector] : smoke detector [Carbon Monoxide Detector] : carbon monoxide detector [Seat Belt] :  uses seat belt [Sunscreen] : uses sunscreen [With Patient/Caregiver] : , with patient/caregiver [Aggressive treatment] : aggressive treatment [1] : 2) Feeling down, depressed, or hopeless for several days (1) [FreeTextEntry1] : none [4 or more  times a week (4 pts)] : 4 or more  times a week (4 points) [de-identified] : no [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  [de-identified] : pt drinks 2 beers daily  [Audit-CScore] : 4 [de-identified] : walking  [[id number]] : 1 [de-identified] : healthy  [Change in mental status noted] : No change in mental status noted [Language] : denies difficulty with language [Reports changes in hearing] : Reports no changes in hearing [Reports changes in vision] : Reports no changes in vision [Reports changes in dental health] : Reports no changes in dental health [MammogramDate] : 06/24 [MammogramComments] : short f/u  [PapSmearDate] : 07/24 [BoneDensityDate] : NA [HIVDate] : 09/20 [ColonoscopyDate] : NA [HepatitisCDate] : 09/20 [AdvancecareDate] : 09/24

## 2025-01-21 ENCOUNTER — NON-APPOINTMENT (OUTPATIENT)
Age: 45
End: 2025-01-21

## 2025-01-22 ENCOUNTER — APPOINTMENT (OUTPATIENT)
Dept: OBGYN | Facility: CLINIC | Age: 45
End: 2025-01-22
Payer: MEDICAID

## 2025-01-22 VITALS
WEIGHT: 180 LBS | SYSTOLIC BLOOD PRESSURE: 124 MMHG | DIASTOLIC BLOOD PRESSURE: 70 MMHG | TEMPERATURE: 97.6 F | HEIGHT: 67 IN | BODY MASS INDEX: 28.25 KG/M2 | HEART RATE: 92 BPM | OXYGEN SATURATION: 99 %

## 2025-01-22 DIAGNOSIS — Z01.419 ENCOUNTER FOR GYNECOLOGICAL EXAMINATION (GENERAL) (ROUTINE) W/OUT ABNORMAL FINDINGS: ICD-10-CM

## 2025-01-22 PROCEDURE — 99396 PREV VISIT EST AGE 40-64: CPT

## 2025-01-23 DIAGNOSIS — E04.1 NONTOXIC SINGLE THYROID NODULE: ICD-10-CM

## 2025-01-23 LAB — HPV HIGH+LOW RISK DNA PNL CVX: NOT DETECTED

## 2025-01-28 LAB — CYTOLOGY CVX/VAG DOC THIN PREP: NORMAL

## 2025-02-07 ENCOUNTER — APPOINTMENT (OUTPATIENT)
Dept: ULTRASOUND IMAGING | Facility: HOSPITAL | Age: 45
End: 2025-02-07

## 2025-02-12 ENCOUNTER — OUTPATIENT (OUTPATIENT)
Dept: OUTPATIENT SERVICES | Facility: HOSPITAL | Age: 45
LOS: 1 days | End: 2025-02-12
Payer: COMMERCIAL

## 2025-02-12 ENCOUNTER — APPOINTMENT (OUTPATIENT)
Dept: ULTRASOUND IMAGING | Facility: CLINIC | Age: 45
End: 2025-02-12
Payer: COMMERCIAL

## 2025-02-12 DIAGNOSIS — Z01.419 ENCOUNTER FOR GYNECOLOGICAL EXAMINATION (GENERAL) (ROUTINE) WITHOUT ABNORMAL FINDINGS: ICD-10-CM

## 2025-02-12 DIAGNOSIS — Z00.8 ENCOUNTER FOR OTHER GENERAL EXAMINATION: ICD-10-CM

## 2025-02-12 PROCEDURE — 76830 TRANSVAGINAL US NON-OB: CPT

## 2025-02-12 PROCEDURE — 76856 US EXAM PELVIC COMPLETE: CPT | Mod: 26

## 2025-02-12 PROCEDURE — 76856 US EXAM PELVIC COMPLETE: CPT

## 2025-02-12 PROCEDURE — 76830 TRANSVAGINAL US NON-OB: CPT | Mod: 26

## 2025-03-04 ENCOUNTER — APPOINTMENT (OUTPATIENT)
Dept: ULTRASOUND IMAGING | Facility: HOSPITAL | Age: 45
End: 2025-03-04
Payer: MEDICAID

## 2025-03-04 ENCOUNTER — APPOINTMENT (OUTPATIENT)
Dept: FAMILY MEDICINE | Facility: CLINIC | Age: 45
End: 2025-03-04
Payer: MEDICAID

## 2025-03-04 ENCOUNTER — RESULT REVIEW (OUTPATIENT)
Age: 45
End: 2025-03-04

## 2025-03-04 ENCOUNTER — APPOINTMENT (OUTPATIENT)
Dept: MAMMOGRAPHY | Facility: HOSPITAL | Age: 45
End: 2025-03-04
Payer: MEDICAID

## 2025-03-04 ENCOUNTER — OUTPATIENT (OUTPATIENT)
Dept: OUTPATIENT SERVICES | Facility: HOSPITAL | Age: 45
LOS: 1 days | End: 2025-03-04
Payer: MEDICAID

## 2025-03-04 VITALS
BODY MASS INDEX: 27.88 KG/M2 | DIASTOLIC BLOOD PRESSURE: 78 MMHG | RESPIRATION RATE: 15 BRPM | HEART RATE: 77 BPM | SYSTOLIC BLOOD PRESSURE: 116 MMHG | OXYGEN SATURATION: 99 % | TEMPERATURE: 97.4 F | WEIGHT: 178 LBS

## 2025-03-04 DIAGNOSIS — D64.9 ANEMIA, UNSPECIFIED: ICD-10-CM

## 2025-03-04 DIAGNOSIS — N92.0 EXCESSIVE AND FREQUENT MENSTRUATION WITH REGULAR CYCLE: ICD-10-CM

## 2025-03-04 DIAGNOSIS — E78.5 HYPERLIPIDEMIA, UNSPECIFIED: ICD-10-CM

## 2025-03-04 DIAGNOSIS — R53.83 OTHER FATIGUE: ICD-10-CM

## 2025-03-04 DIAGNOSIS — Z01.419 ENCOUNTER FOR GYNECOLOGICAL EXAMINATION (GENERAL) (ROUTINE) WITHOUT ABNORMAL FINDINGS: ICD-10-CM

## 2025-03-04 DIAGNOSIS — E55.9 VITAMIN D DEFICIENCY, UNSPECIFIED: ICD-10-CM

## 2025-03-04 PROCEDURE — 77063 BREAST TOMOSYNTHESIS BI: CPT | Mod: 26

## 2025-03-04 PROCEDURE — 76641 ULTRASOUND BREAST COMPLETE: CPT | Mod: 26,50

## 2025-03-04 PROCEDURE — G2211 COMPLEX E/M VISIT ADD ON: CPT | Mod: NC

## 2025-03-04 PROCEDURE — 77063 BREAST TOMOSYNTHESIS BI: CPT

## 2025-03-04 PROCEDURE — 77067 SCR MAMMO BI INCL CAD: CPT | Mod: 26

## 2025-03-04 PROCEDURE — 77067 SCR MAMMO BI INCL CAD: CPT

## 2025-03-04 PROCEDURE — 99215 OFFICE O/P EST HI 40 MIN: CPT

## 2025-03-04 PROCEDURE — 76641 ULTRASOUND BREAST COMPLETE: CPT

## 2025-03-10 LAB
25(OH)D3 SERPL-MCNC: 20.1 NG/ML
ALBUMIN SERPL ELPH-MCNC: 4.4 G/DL
ALP BLD-CCNC: 75 U/L
ALT SERPL-CCNC: 7 U/L
ANION GAP SERPL CALC-SCNC: 13 MMOL/L
AST SERPL-CCNC: 15 U/L
BASOPHILS # BLD AUTO: 0.04 K/UL
BASOPHILS NFR BLD AUTO: 0.8 %
BILIRUB SERPL-MCNC: 0.2 MG/DL
BUN SERPL-MCNC: 17 MG/DL
CALCIUM SERPL-MCNC: 9.3 MG/DL
CHLORIDE SERPL-SCNC: 105 MMOL/L
CHOLEST SERPL-MCNC: 202 MG/DL
CO2 SERPL-SCNC: 22 MMOL/L
CREAT SERPL-MCNC: 0.69 MG/DL
EGFRCR SERPLBLD CKD-EPI 2021: 110 ML/MIN/1.73M2
EOSINOPHIL # BLD AUTO: 0.13 K/UL
EOSINOPHIL NFR BLD AUTO: 2.6 %
FERRITIN SERPL-MCNC: 10 NG/ML
FOLATE SERPL-MCNC: 13.3 NG/ML
GLUCOSE SERPL-MCNC: 100 MG/DL
HCT VFR BLD CALC: 35.4 %
HDLC SERPL-MCNC: 69 MG/DL
HGB BLD-MCNC: 10.9 G/DL
IMM GRANULOCYTES NFR BLD AUTO: 0.2 %
IRON SATN MFR SERPL: 6 %
IRON SERPL-MCNC: 20 UG/DL
LDLC SERPL CALC-MCNC: 118 MG/DL
LYMPHOCYTES # BLD AUTO: 1.69 K/UL
LYMPHOCYTES NFR BLD AUTO: 33.4 %
MAN DIFF?: NORMAL
MCHC RBC-ENTMCNC: 22.1 PG
MCHC RBC-ENTMCNC: 30.8 G/DL
MCV RBC AUTO: 71.8 FL
MONOCYTES # BLD AUTO: 0.58 K/UL
MONOCYTES NFR BLD AUTO: 11.5 %
NEUTROPHILS # BLD AUTO: 2.61 K/UL
NEUTROPHILS NFR BLD AUTO: 51.5 %
NONHDLC SERPL-MCNC: 133 MG/DL
PLATELET # BLD AUTO: 344 K/UL
POTASSIUM SERPL-SCNC: 4.7 MMOL/L
PROT SERPL-MCNC: 7.2 G/DL
RBC # BLD: 4.93 M/UL
RBC # BLD: 4.93 M/UL
RBC # FLD: 16.4 %
RETICS # AUTO: 0.9 %
RETICS AGGREG/RBC NFR: 44.9 K/UL
SODIUM SERPL-SCNC: 140 MMOL/L
TIBC SERPL-MCNC: 358 UG/DL
TRANSFERRIN SERPL-MCNC: 285 MG/DL
TRIGL SERPL-MCNC: 85 MG/DL
UIBC SERPL-MCNC: 338 UG/DL
VIT B12 SERPL-MCNC: 716 PG/ML
WBC # FLD AUTO: 5.06 K/UL

## 2025-03-13 RX ORDER — FERROUS SULFATE 325(65) MG
325 (65 FE) TABLET ORAL DAILY
Qty: 90 | Refills: 1 | Status: ACTIVE | COMMUNITY
Start: 2025-03-13 | End: 1900-01-01

## 2025-03-13 RX ORDER — ASCORBIC ACID 500 MG
500 TABLET ORAL DAILY
Qty: 90 | Refills: 1 | Status: ACTIVE | COMMUNITY
Start: 2025-03-13 | End: 1900-01-01

## 2025-03-24 ENCOUNTER — APPOINTMENT (OUTPATIENT)
Dept: ULTRASOUND IMAGING | Facility: HOSPITAL | Age: 45
End: 2025-03-24
Payer: MEDICAID

## 2025-03-24 ENCOUNTER — OUTPATIENT (OUTPATIENT)
Dept: OUTPATIENT SERVICES | Facility: HOSPITAL | Age: 45
LOS: 1 days | End: 2025-03-24
Payer: MEDICAID

## 2025-03-24 DIAGNOSIS — E04.1 NONTOXIC SINGLE THYROID NODULE: ICD-10-CM

## 2025-03-24 PROCEDURE — 76536 US EXAM OF HEAD AND NECK: CPT

## 2025-03-24 PROCEDURE — 76536 US EXAM OF HEAD AND NECK: CPT | Mod: 26

## 2025-04-22 ENCOUNTER — APPOINTMENT (OUTPATIENT)
Dept: SURGERY | Facility: CLINIC | Age: 45
End: 2025-04-22
Payer: MEDICAID

## 2025-04-22 DIAGNOSIS — E04.1 NONTOXIC SINGLE THYROID NODULE: ICD-10-CM

## 2025-04-22 PROCEDURE — 99213 OFFICE O/P EST LOW 20 MIN: CPT
